# Patient Record
Sex: FEMALE | Race: WHITE
[De-identification: names, ages, dates, MRNs, and addresses within clinical notes are randomized per-mention and may not be internally consistent; named-entity substitution may affect disease eponyms.]

---

## 2017-05-04 ENCOUNTER — HOSPITAL ENCOUNTER (OUTPATIENT)
Dept: HOSPITAL 8 - LAB | Age: 22
Discharge: HOME | End: 2017-05-04
Payer: SELF-PAY

## 2017-05-04 DIAGNOSIS — K58.9: Primary | ICD-10-CM

## 2017-05-04 PROCEDURE — 36415 COLL VENOUS BLD VENIPUNCTURE: CPT

## 2018-06-05 ENCOUNTER — OFFICE VISIT (OUTPATIENT)
Dept: RHEUMATOLOGY | Facility: PHYSICIAN GROUP | Age: 23
End: 2018-06-05

## 2018-06-05 VITALS
WEIGHT: 123.2 LBS | BODY MASS INDEX: 18.19 KG/M2 | OXYGEN SATURATION: 97 % | TEMPERATURE: 98.5 F | HEART RATE: 70 BPM | RESPIRATION RATE: 16 BRPM | DIASTOLIC BLOOD PRESSURE: 80 MMHG | SYSTOLIC BLOOD PRESSURE: 112 MMHG

## 2018-06-05 DIAGNOSIS — R77.8 LOW SERUM COMPLEMENT C4: ICD-10-CM

## 2018-06-05 DIAGNOSIS — R76.8 POSITIVE ANA (ANTINUCLEAR ANTIBODY): ICD-10-CM

## 2018-06-05 DIAGNOSIS — M25.50 POLYARTHRALGIA: ICD-10-CM

## 2018-06-05 PROCEDURE — 99244 OFF/OP CNSLTJ NEW/EST MOD 40: CPT | Performed by: INTERNAL MEDICINE

## 2018-06-05 RX ORDER — PREDNISONE 5 MG/1
5 TABLET ORAL DAILY
COMMUNITY
End: 2019-10-10

## 2018-06-05 RX ORDER — LEFLUNOMIDE 10 MG/1
10 TABLET ORAL DAILY
COMMUNITY
End: 2018-07-31

## 2018-06-05 RX ORDER — CHLORAL HYDRATE 500 MG
1000 CAPSULE ORAL
COMMUNITY
End: 2020-12-19

## 2018-06-05 ASSESSMENT — PAIN SCALES - GENERAL: PAINLEVEL: NO PAIN

## 2018-07-14 ENCOUNTER — HOSPITAL ENCOUNTER (OUTPATIENT)
Dept: LAB | Facility: MEDICAL CENTER | Age: 23
End: 2018-07-14
Attending: INTERNAL MEDICINE
Payer: COMMERCIAL

## 2018-07-14 DIAGNOSIS — R77.8 LOW SERUM COMPLEMENT C4: ICD-10-CM

## 2018-07-14 DIAGNOSIS — M25.50 POLYARTHRALGIA: ICD-10-CM

## 2018-07-14 DIAGNOSIS — R76.8 POSITIVE ANA (ANTINUCLEAR ANTIBODY): ICD-10-CM

## 2018-07-14 PROCEDURE — 36415 COLL VENOUS BLD VENIPUNCTURE: CPT

## 2018-07-14 PROCEDURE — 86480 TB TEST CELL IMMUN MEASURE: CPT

## 2018-07-16 LAB
M TB TUBERC IFN-G BLD QL: NEGATIVE
M TB TUBERC IFN-G/MITOGEN IGNF BLD: 0
M TB TUBERC IGNF/MITOGEN IGNF CONTROL: 41.8 [IU]/ML
MITOGEN IGNF BCKGRD COR BLD-ACNC: 0.03 [IU]/ML

## 2018-07-24 ENCOUNTER — APPOINTMENT (OUTPATIENT)
Dept: RHEUMATOLOGY | Facility: PHYSICIAN GROUP | Age: 23
End: 2018-07-24
Payer: COMMERCIAL

## 2018-07-31 ENCOUNTER — OFFICE VISIT (OUTPATIENT)
Dept: RHEUMATOLOGY | Facility: PHYSICIAN GROUP | Age: 23
End: 2018-07-31
Payer: COMMERCIAL

## 2018-07-31 VITALS
OXYGEN SATURATION: 98 % | TEMPERATURE: 97.8 F | HEART RATE: 58 BPM | BODY MASS INDEX: 19.02 KG/M2 | RESPIRATION RATE: 16 BRPM | WEIGHT: 128.8 LBS | DIASTOLIC BLOOD PRESSURE: 80 MMHG | SYSTOLIC BLOOD PRESSURE: 110 MMHG

## 2018-07-31 DIAGNOSIS — R53.83 OTHER FATIGUE: ICD-10-CM

## 2018-07-31 DIAGNOSIS — Z79.899 LONG-TERM USE OF HYDROXYCHLOROQUINE: ICD-10-CM

## 2018-07-31 DIAGNOSIS — R76.8 POSITIVE ANA (ANTINUCLEAR ANTIBODY): ICD-10-CM

## 2018-07-31 DIAGNOSIS — M06.00 SERONEGATIVE RHEUMATOID ARTHRITIS (HCC): ICD-10-CM

## 2018-07-31 PROCEDURE — 99214 OFFICE O/P EST MOD 30 MIN: CPT | Performed by: INTERNAL MEDICINE

## 2018-07-31 RX ORDER — HYDROXYCHLOROQUINE SULFATE 200 MG/1
200 TABLET, FILM COATED ORAL DAILY
Qty: 30 TAB | Refills: 3 | Status: SHIPPED | OUTPATIENT
Start: 2018-07-31 | End: 2019-06-06

## 2018-07-31 ASSESSMENT — ENCOUNTER SYMPTOMS
FEVER: 0
CHILLS: 0
MYALGIAS: 0
COUGH: 0

## 2018-08-01 NOTE — PROGRESS NOTES
Chief Complaint- positive FANY    Chief Complaint   Patient presents with   • New Patient     Amalia Villalba is a 22 y.o. female here as a new Rheumatology Consult referred by Pcp Pt States None for consultation regarding positive FANY          HPI:   22 y.o. female presenting with a diagnosis of seronegative rheumatoid arthritis diagnosed in [place].  She was started on leflunomide and reports being on this medication for about 3 months.  She notes improvement with decrease swelling and decrease episodes of flares..  SHe reports sicca symptoms, polyarthralgia and oral ulcers.    Ms. Villalba states she had noticed b/l hand, wrist and knee pain since when she was in high school.   Her symptoms have worsened. Thus, she was evaluated by rheumatologist in [place] in 1/2018 and was diagnosed with seronegative RA.  She tried Methotrexate and didn't tolerate MTX.  She didn't tolerate Sulfasalazine due to GI adverse effect.  She didn't want to try Plaquenil because of adverse effect of vision changes.   She was started on Leflunomide around 3/2018.  She feels that leflunamide is helping with her symptoms.  Though, she still has significant joint pain.  She states that she gets swelling and pain of b/l MCP and PIP joints and toes symmetrically.  She also c/o b/l knee pain.  She also reports mid thoracic back and neck pain which is worse at night when she is not moving.  She reports morning stiffness lasting about 30-60 min.  She reports 4 flare ups between March and May with her last flare up being the most severe where she had diffuse pain, stiffness and difficulty with mobility.   She was treated with Prednisone and last dose of Prednisone was week of 5/11.  She reports intermittent oral sore every few months and petechia like rash on her arms b/l.   She has sicca symptoms.    She has a h/o IBS.  She states that she sometimes gets bloody diarrhea.  She was evaluated by GI with EGD and Colonoscopy in the past.  Patient  states work up was negative except for GERD which she attributes it to drinking etoh and consuming spicy food.   She stopped drinking and cut down on spicy food.   She is currently on whole 30 diet that has low antiinflammatory diet.      On ROS, she reported chills and nightsweats.  She had multiple negative TB tests with last TB was 1 year ago.   No history of TB in the past.   SHe does report fingers turning purple and white suggestive of Raynauds however on further questioning it seems she is describing diffuse rather than a well demarcated region.      Pertinent Labs:  Lyme antibodies were negative 8/2/2017  TSH 1.68  8/2/2017  CCP negative  8/2/2017  EBV IgM was negative  8/2/2017  EBV IgG elevated at 361 (>21.99 is positive)  Ferritin = 24 8/2/2017  AMSA = negative  stritated muscle antibody screening negative  Anti myocardial antibody is negative  dsDNA by crithidia method is negative 8/2/2017  C3C = 109   8/2/2017  C4c = 13 (16-47) 8/2/2017  Ribosomal antibody is negative  8/2/2017  scl 70 antibody is negative 8/2/2017  Sm/RNP antibody is negative  8/2/2017  SSB antibody is negative  8/2/2017  SSA antibody is negative 8/2/2017  Rheumatoid factor is negative 8/2/2017  FANY 1:80 titer speckled  8/2/2017  Sm antibody is negative 8/2/2017  H pylori breath test was negative 8/2/2017      Past Medical HIstory: GERD, IBS with diarrhea, bilateral wrist pain, diagnosed with seronegative rheumatoid arthritis, no miscarriages and no blood clot history    Family History: autn has rheumatoid arthirtis, father has thyroid disease    Social History: rarely drinks alcohol, does not smoke cigarrettes uses CBD Liquid, smokes marijuana, on whole 30 diet      Previous Medication  leflunomide  Methotrexate - did not make her feel well  Sulfasalazine  - cause GI problem    Has not tried  plaquenil    Current medicines (including changes today)  Current Outpatient Prescriptions   Medication Sig Dispense Refill   • leflunomide  (ARAVA) 10 MG Tab Take 10 mg by mouth every day.     • predniSONE (DELTASONE) 5 MG Tab Take 5 mg by mouth every day.     • vitamin D (CHOLECALCIFEROL) 1000 UNIT Tab Take 1,000 Units by mouth every day.     • VITAMIN K PO Take  by mouth.     • Omega-3 Fish Oil (OMEGA 3 FATTY ACIDS) 1000 MG Cap Take 1,000 mg by mouth 3 times a day, with meals.     • Borage, Borago officinalis, (BORAGE OIL PO) Take  by mouth.     • Multiple Vitamins-Minerals (MULTIVITAMIN ADULT PO) Take  by mouth.       No current facility-administered medications for this visit.      She  has a past medical history of Asthma.  She  has no past surgical history on file.  No family history on file.  No family status information on file.     Social History   Substance Use Topics   • Smoking status: Never Smoker   • Smokeless tobacco: Not on file   • Alcohol use No     Social History     Social History Narrative   • No narrative on file         ROS  Review of Systems   Constitutional: Positive for chills and weight loss (Intentional). Negative for fever.        Night sweats   HENT: Negative for congestion and nosebleeds.    Eyes: Positive for redness. Negative for pain.        Dry eyes   Respiratory: Positive for shortness of breath. Negative for cough and hemoptysis.    Cardiovascular: Negative for chest pain and palpitations.   Gastrointestinal: Positive for diarrhea. Negative for abdominal pain.        Heartburn controlled with diet modification   Genitourinary: Negative for dysuria and hematuria.        Polyuria   Musculoskeletal:        Per HPI   Skin: Negative for itching.        Intermittent petechia like rash on b/l UE   Neurological: Negative.  Negative for headaches.   Endo/Heme/Allergies: Bruises/bleeds easily.          Objective:     Blood pressure 112/80, pulse 70, temperature 36.9 °C (98.5 °F), resp. rate 16, weight 55.9 kg (123 lb 3.2 oz), SpO2 97 %. Body mass index is 18.19 kg/m².  Physical Exam:    Vitals:    06/05/18 1607   BP: 112/80    Pulse: 70   Resp: 16   Temp: 36.9 °C (98.5 °F)   SpO2: 97%   Weight: 55.9 kg (123 lb 3.2 oz)    Body mass index is 18.19 kg/m².    General/Constitutional: NAD not diaphoretic, comfortable  Eyes: clear conjunctiva, no scleral icterus, EOMI  Ears, Nose, Mouth,Throat: no oral ulcers, good dentition, torrus on hard palate, moist mucous membranes, no discharge from ears bilaterally  Cardiovascular: regular rate and rhythm.  No murmurs, gallops, rubs  Respiratory: normal effort, unlabored respiration.  On auscultation no wheezes, rales, rhonchi.  Clear to auscultation.  GI: soft, NTTP no hepatosplenomegaly, nondistended  Musculoskeletal  Axial:  Cervical: negative occiput to wall testing  Thoracic: Non tender  Lumbar: Non tender  Schoeber test: Positive.  3.5 cm change on forward flexion.      Upper Extremities:  No synovitis of the PIP, DIP, MCP.   Bilateral MCP and PIP : Boggy and tender to palpation  No Heberbon nodes appreciated at the DIP  Wrists and Elbows have good ROM without any rheumatoid nodules  Muscle Strength: 5/5 in deltoids, biceps, triceps, finger , wrists extension bilateral  Lower Extremities:  No knee effusion bilateral, No crepitus bilateral  MTP tender to palpation bilaterally  Muscle Strength: 5/5 in dorsiflexion, plantarflexion, knee extension, knee flexion, and hip flexion bilateral  Gait is normal  Skin: Limited skin exam.  no rashes, no digital ulcerations, no alopecia, no tophi, no skin thickening, no nodules  Neuro: CN II-XII grossly intact, Alert, Oriented x 3, moves all four extremities  Psych: normal affect, normal mood, judgement appropriate, follows commands, responses are appropritae  Heme/Lymph: no cervical adenopathy      No results found for: QNTTBGOLD  No results found for: HEPBCORTOT, HEPBCORIGM, HEPBSAG  No results found for: HEPCAB  No results found for: SODIUM, POTASSIUM, CHLORIDE, CO2, GLUCOSE, BUN, CREATININE, BUNCREATRAT, GLOMRATE   No results found for: WBC, RBC,  HEMOGLOBIN, HEMATOCRIT, MCV, MCH, MCHC, MPV, NEUTSPOLYS, LYMPHOCYTES, MONOCYTES, EOSINOPHILS, BASOPHILS, HYPOCHROMIA, ANISOCYTOSIS   No results found for: CALCIUM, ASTSGOT, ALTSGPT, ALKPHOSPHAT, TBILIRUBIN, ALBUMIN, TOTPROTEIN  No results found for: URICACID, RHEUMFACTN, CCPANTIBODY, ANTINUCAB  No results found for: SEDRATEWES, CREACTPROT  No results found for: RUSSELVIPER, DRVVTINTERP  No results found for: Z7GLFPLXHYQ, H7UMMGTXFKB, IGGCARDIOLI, IGMCARDIOLI, IGACARDIOLI, CRYOGLOBULIN  No results found for: ANADIRECT, ANTIDNADS, RNPAB, SMITHAB, MBELLPE97, SSAROAB, SSBLAAB, ZOVIGI3WH, CENTROMBAB  No results found for: ANTIDNADS, DSDNA, AGBMAB, GBMABA, ANCAIGG, Q4FRVBECBTV, JO1AB, RNPAB, ANTISSASJ, ANTISSBSJ  No results found for: COLORURINE, SPECGRAVITY, PHURINE, GLUCOSEUR, KETONES, PROTEINURIN  No results found for: TOTPROTUR, TOTALVOLUME, [id number]  No results found for: SSA60, SSA52  No results found for: HBA1C  No results found for: CPKTOTAL  No results found for: G6PD  No results found for: YPFL09BMIF  No results found for: ACESERUM  No results found for: 25HYDROXY  No results found for: TSH, FREEDIR  No results found for: TSHULTRASEN, FREET4  No results found for: MICROSOMALA, ANTITHYROGL  No results found for: IGGLYMEABS  No results found for: ANTIMITOCHO, FACTIN  No results found for: IGA, TTRANSIGA, ENDOIGA  No results found for: FLTYPE, CRYSTALSBDF  No results found for: ISTATICAL  No results found for: ISTATCREAT  No results found for: CTELOPEP  No results found for: GBMABG  No results found for: PTHINTACT        Assessment and Plan:    This is a 23 y/o F who was recently diagnosed with seronegative RA.  She reports symmetrical polyarthritis involving wrists, MCP and PIP and MTP joints.   Her prior lab revealed weakly positive FANY of 1:80 in speckled pattern and mildly decreased C4.   In the setting of symmetrical polyarthritis and positive FANY, her oral ulcer is concerning for SLE.   Her midthoracic back  pain as well as positive Schober test and chronic diarrhea are concerning for Seronegative spondyloarthropathy, specifically Ankylosing Spondylosis.  Will check for Hep B, C and further autoimmune work up.  Meantime, patient to continue Leflunomide.  Discussed with patient extensively about teratogenicity of Leflunomide.   She should be on birth control method while on Leflunomide and wait about 1 year to conceive after discontinuing leflunamide.   If leflunamide is not effective, next step would be biologics since she didn't tolerate Sulfasalazine and MTX and doesn't want to use Plaquenil due to its adverse effect.       Plan:   Continue Leflunomide after completing medication toxicity labs and screening labs.  Will get following labs for further work-up of her FANY and concern for inflammatory back pain symptoms.  1. Positive FANY (antinuclear antibody)  ANTITHYROGLOBULIN AB    THYROID PEROXIDASE  (TPO) AB    ANTI SCL-70 ABS    CENTROMERE AB, IGG    COMPLEMENT TOTAL (CH50)    ANTI-DNA (DS)    COMPLEMENT C3    COMPLEMENT C4    WESTERGREN SED RATE    CRP QUANTITIVE (NON-CARDIAC)    CBC WITH DIFFERENTIAL    COMP METABOLIC PANEL    G6PD QUANT + RBC    HEP B CORE AB TOTAL    HEP B SURFACE ANTIGEN    HEP C VIRUS ANTIBODY    TB TEST CELL IMM MEASURE AG (QUANTIFERON)    HLA-B27    DX-SACROILIAC JOINTS 3+    ANTI-HISTONE ABS   2. Low serum complement C4  ANTITHYROGLOBULIN AB    THYROID PEROXIDASE  (TPO) AB    ANTI SCL-70 ABS    CENTROMERE AB, IGG    COMPLEMENT TOTAL (CH50)    ANTI-DNA (DS)    COMPLEMENT C3    COMPLEMENT C4    WESTERGREN SED RATE    CRP QUANTITIVE (NON-CARDIAC)    CBC WITH DIFFERENTIAL    COMP METABOLIC PANEL    G6PD QUANT + RBC    HEP B CORE AB TOTAL    HEP B SURFACE ANTIGEN    HEP C VIRUS ANTIBODY    TB TEST CELL IMM MEASURE AG (QUANTIFERON)    HLA-B27    DX-SACROILIAC JOINTS 3+    ANTI-HISTONE ABS   3. Polyarthralgia  ANTITHYROGLOBULIN AB    THYROID PEROXIDASE  (TPO) AB    ANTI SCL-70 ABS    CENTROMERE AB,  IGG    COMPLEMENT TOTAL (CH50)    ANTI-DNA (DS)    COMPLEMENT C3    COMPLEMENT C4    WESTERGREN SED RATE    CRP QUANTITIVE (NON-CARDIAC)    CBC WITH DIFFERENTIAL    COMP METABOLIC PANEL    G6PD QUANT + RBC    HEP B CORE AB TOTAL    HEP B SURFACE ANTIGEN    HEP C VIRUS ANTIBODY    TB TEST CELL IMM MEASURE AG (QUANTIFERON)    HLA-B27    DX-SACROILIAC JOINTS 3+    ANTI-HISTONE ABS     Return in about 5 weeks (around 7/10/2018).      Records requested from Rheumatology in [place]    Followup: Return in about 5 weeks (around 7/10/2018). or sooner prn  Patient was seen 60 minutes face-to-face (excluding time for procedures)  of which more than 50% the time was spent counseling the patient regarding  rheumatological conditions and care. Therapy was discussed in detail.  Thank you for this referral.      At the time of the visit, I personally examined the patient and evaluated the patient's medical history, physical examination, laboratory results/studies, and assessment and I discussed the findings and formulated the care plan documented with the resident physician, Dr. Dangelo

## 2018-08-01 NOTE — PROGRESS NOTES
ESTABLISHED PATIENT    Ms. Amalia Villalba is a 22 y.o. female here for follow-up for seronegative rheumatoid arthritis    Seronegative rheumatoid arthritis  She was on leflunomide for 3 months and didn't notice an improvement.  She reports morning stiffness in her hands and wrists that lasts 30 minutes and she notes mild swelling in her fingers such that she is unable to wear ring.   She is also reporting back pain.  SHe also reports pain is worse at night      Current Medications:  Not on any medication      RHEUM HISTORY  22 y.o. female presenting with a diagnosis of seronegative rheumatoid arthritis diagnosed in [place].  She was started on leflunomide and reports being on this medication for about 3 months.  She notes improvement with decrease swelling and decrease episodes of flares..  SHe reports sicca symptoms, polyarthralgia and oral ulcers.     Ms. Villalba states she had noticed b/l hand, wrist and knee pain since when she was in high school.   Her symptoms have worsened. Thus, she was evaluated by rheumatologist in [place] in 1/2018 and was diagnosed with seronegative RA.  She tried Methotrexate and didn't tolerate MTX.  She didn't tolerate Sulfasalazine due to GI adverse effect.  She didn't want to try Plaquenil because of adverse effect of vision changes.   She was started on Leflunomide around 3/2018.  She feels that leflunamide is helping with her symptoms.  Though, she still has significant joint pain.  She states that she gets swelling and pain of b/l MCP and PIP joints and toes symmetrically.  She also c/o b/l knee pain.  She also reports mid thoracic back and neck pain which is worse at night when she is not moving.  She reports morning stiffness lasting about 30-60 min.  She reports 4 flare ups between March and May with her last flare up being the most severe where she had diffuse pain, stiffness and difficulty with mobility.   She was treated with Prednisone and last dose of Prednisone was  week of 5/11.  She reports intermittent oral sore every few months and petechia like rash on her arms b/l.   She has sicca symptoms.    She has a h/o IBS.  She states that she sometimes gets bloody diarrhea.  She was evaluated by GI with EGD and Colonoscopy in the past.  Patient states work up was negative except for GERD which she attributes it to drinking etoh and consuming spicy food.   She stopped drinking and cut down on spicy food.   She is currently on whole 30 diet that has low antiinflammatory diet.       In review of records from [facility] she first presented to rheumatology to see Maite Quiles MD on February 16, 2018 and at that time had complained of pain in her fingers and knees.  She had reported that she had shooting pains in her knees even death as a child.  She also reports dry eyes and dry throat and dry skin.  And also increased fatigue.  Patient also notes canker sores since childhood as well.  At the initial exam there is discomfort of the MCPs and PIPs but no acute swelling.  PIPs also has a joint appearance of mild arthritis.  Otherwise no swelling of the wrists or DIPs or elbows.  There is also noted upper back discomfort at the level of the scapula on the right and right mid back pain.  Labs at that time did demonstrate that she had a positive FANY speckled pattern and C3 was slightly low at 13.    In follow-up she was noted to not respond to ibuprofen and she had morning symptoms of pain and tightness.  She was given a trial of methotrexate this was her last visit note that we have.     Pertinent Labs:  Lyme antibodies were negative 8/2/2017  TSH 1.68  8/2/2017  CCP negative  8/2/2017  EBV IgM was negative  8/2/2017  EBV IgG elevated at 361 (>21.99 is positive)  Ferritin = 24 8/2/2017  AMSA = negative  stritated muscle antibody screening negative  Anti myocardial antibody is negative  dsDNA by crithidia method is negative 8/2/2017  C3C = 109   8/2/2017  C4c = 13 (16-47)  8/2/2017  Ribosomal antibody is negative  8/2/2017  scl 70 antibody is negative 8/2/2017  Sm/RNP antibody is negative  8/2/2017  SSB antibody is negative  8/2/2017  SSA antibody is negative 8/2/2017  Rheumatoid factor is negative 8/2/2017  FANY 1:80 titer speckled  8/2/2017  Sm antibody is negative 8/2/2017  H pylori breath test was negative 8/2/2017   IgA serum 153 ()  4/30/2016  TRansglulaminase IgA was negative 4/30/2016  G6PD HLA-B27 antigen was negative on 6/28/2018  Antihistone antibody was negative on 6/28/2018  Thyroid peroxidase and thyroglobulin antibodies were negative on 6/28/2018  G6PD Enzyme was 15.9 (7.0-20.5)  Anti-double-stranded DNA was negative  SCL 70 antibody was negative  Centromere antibody was negative on 6/28/2018  Hepatitis B surface antigen was nonreactive on 6/28/2018  Hepatitis C antibody was negative on 6/28/2018  Hepatitis B core antibody was nonreactive         Past Medical HIstory: GERD, IBS with diarrhea, bilateral wrist pain, diagnosed with seronegative rheumatoid arthritis, no miscarriages and no blood clot history     Family History: autn has rheumatoid arthirtis, father has thyroid disease, maternal history of breast cancer family history of hypertension and paternal side had thyroid disease.  Older sister committed suicide in 2016 jumped off a bridge in [place].     Social History: rarely drinks alcohol, does not smoke cigarrettes uses CBD Liquid, smokes marijuana, on whole 30 diet        Previous Medication  leflunomide  Methotrexate - did not make her feel well  Sulfasalazine  - cause GI problem     Has not tried  plaquenil              Current Outpatient Prescriptions on File Prior to Visit   Medication Sig Dispense Refill   • predniSONE (DELTASONE) 5 MG Tab Take 5 mg by mouth every day.     • vitamin D (CHOLECALCIFEROL) 1000 UNIT Tab Take 1,000 Units by mouth every day.     • VITAMIN K PO Take  by mouth.     • Omega-3 Fish Oil (OMEGA 3 FATTY ACIDS) 1000 MG Cap Take 1,000  mg by mouth 3 times a day, with meals.     • Borage, Borago officinalis, (BORAGE OIL PO) Take  by mouth.     • Multiple Vitamins-Minerals (MULTIVITAMIN ADULT PO) Take  by mouth.     • leflunomide (ARAVA) 10 MG Tab Take 10 mg by mouth every day.       No current facility-administered medications on file prior to visit.        REVIEW OF SYSTEMS  Review of Systems   Constitutional: Negative for chills and fever.   Respiratory: Negative for cough.    Musculoskeletal: Negative for myalgias.       PHYSICAL EXAM  Ambulatory Vitals  Encounter Vitals  Temperature: 36.6 °C (97.8 °F)  Temp Source: Temporal  Blood Pressure: 110/80  BP Location: Right, Upper Arm  Patient BP Position: Sitting  Pulse: (!) 58  Respiration: 16  Pulse Oximetry: 98 %  Weigh58.4t:  kg (128 lb 12.8 oz)  Weight Source: Stand Up Scale    Physical Exam   Constitutional: She is oriented to person, place, and time and well-developed, well-nourished, and in no distress. No distress.   HENT:   Head: Normocephalic and atraumatic.   Right Ear: External ear normal.   Left Ear: External ear normal.   Eyes: Conjunctivae are normal. Right eye exhibits no discharge. Left eye exhibits no discharge. No scleral icterus.   Cardiovascular: Normal rate.    Pulmonary/Chest: Effort normal. No stridor. No respiratory distress.   Musculoskeletal: She exhibits no edema.   No active synovitis appreciated on palpation but there is tenderness of the MCPs and PIPs in the wrists.  There is mild bogginess in the left wrist on palpation.   Neurological: She is alert and oriented to person, place, and time. Gait normal.   Skin: Skin is warm and dry. No rash noted. She is not diaphoretic. No erythema.   Psychiatric: Mood, memory, affect and judgment normal.   Vitals reviewed.          DIAGNOSTICS:    Labs      Lab Results   Component Value Date/Time    QNTTBGOLD Negative 07/14/2018 01:42 PM     Labs dated June 28, 2018 showed white blood cell count of 4.6 with a hemoglobin of 12.7 and a  platelet count of 239.  AST was 15 ALT is 12 creatinine was 0.91.  ESR was 9  Imaging          ASSESSMENT AND PLAN:  Ms. Amalia Villalba presents for follow-up of seronegative rheumatoid arthritis.  The patient reports that she has been nonresponsive to leflunomide, intolerant of methotrexate and sulfasalazine.  I suggested today that we try Plaquenil.  We will give this a trial of 9 months.  I did discuss with her the importance of getting a baseline eye exam and follow with eye exams every 9 months I also discussed with her the risks and benefits of Plaquenil including but not limited to cytopenias as well as rash/photosensitivity, as well as retinal toxicity.    We did discuss the possibility of anti-TNF therapy however at this time think we should retry Plaquenil first.    I did discuss with the patient that there is a possibility there is a component of her pain that could be neuropathic and proposed gabapentin.  Patient is reluctant to try given that there is some degree of cross-reactivity with a birth control that had caused a rash.  We will revisit in the future.  In review of her records she does have a low complement C4 level that was noted on her records from [place].    I suggest that we will recheck that in the future.    Currently the patient is reporting fatigue which could be due to her systemic disease.  I will also check a TSH.          1. Other fatigue  TSH+FREE T4   2. Long-term use of hydroxychloroquine  CBC WITH DIFFERENTIAL    COMP METABOLIC PANEL    REFERRAL TO OPHTHALMOLOGY   3. Seronegative rheumatoid arthritis (HCC)     4. Positive FANY (antinuclear antibody)       Return in about 8 weeks (around 9/25/2018).        she agreed and verbalized she agreement and understanding with the current plan. I answered all questions and concerns she has at this time and advised our patient to call at any time in there interim with questions or concerns in regards she care.     Thank you for  allowing me to participate in the care of this patient, I will continue to follow closely.      Please note that this dictation was created using voice recognition software. I have made every reasonable attempt to correct obvious errors, but I expect that there are errors of grammar and possibly content that I did not discover before finalizing the note.

## 2019-06-06 ENCOUNTER — OFFICE VISIT (OUTPATIENT)
Dept: RHEUMATOLOGY | Facility: MEDICAL CENTER | Age: 24
End: 2019-06-06
Payer: COMMERCIAL

## 2019-06-06 VITALS
HEART RATE: 104 BPM | SYSTOLIC BLOOD PRESSURE: 85 MMHG | HEIGHT: 69 IN | BODY MASS INDEX: 20.7 KG/M2 | DIASTOLIC BLOOD PRESSURE: 60 MMHG | TEMPERATURE: 98.7 F | OXYGEN SATURATION: 98 % | WEIGHT: 139.77 LBS

## 2019-06-06 DIAGNOSIS — Z79.899 LONG-TERM USE OF PLAQUENIL: ICD-10-CM

## 2019-06-06 DIAGNOSIS — M06.00 SERONEGATIVE RHEUMATOID ARTHRITIS (HCC): Primary | ICD-10-CM

## 2019-06-06 DIAGNOSIS — R77.8 LOW SERUM COMPLEMENT C4: ICD-10-CM

## 2019-06-06 PROCEDURE — 99214 OFFICE O/P EST MOD 30 MIN: CPT | Performed by: INTERNAL MEDICINE

## 2019-06-06 RX ORDER — HYDROXYCHLOROQUINE SULFATE 200 MG/1
300 TABLET, FILM COATED ORAL DAILY
Qty: 135 TAB | Refills: 1 | Status: SHIPPED | OUTPATIENT
Start: 2019-06-06 | End: 2019-07-23 | Stop reason: SDUPTHER

## 2019-06-06 RX ORDER — MELOXICAM 15 MG/1
15 TABLET ORAL DAILY
Qty: 90 TAB | Refills: 1 | Status: SHIPPED | OUTPATIENT
Start: 2019-06-06 | End: 2019-07-23 | Stop reason: SDUPTHER

## 2019-06-06 NOTE — PROGRESS NOTES
"RHEUMATOLOGY CLINIC FOLLOW UP NOTE        Chief complaint:  Follow up seronegative RA        HPI:  22 y/o F with seronegative RA diagnosed in [place] presents today for follow up,  she is a new patient to me, previously followed by Dr. Andre, last seen 7/2018.    At the last visit she was ordered to start plaquenil however never did so due to fear of side effects.  She reports that she actually went flare free until starting in February of this year.    Patient reports that that her hands, wrists, knees and toes are the most affected.  She will only notice swelling of her fingers and toes.  She will get \"flares\" that last about 1 week, has had 3 flares over the last few months.  She takes naproxen which varma snot help.  She reports that alcohol and CBD do help. She has been given prednisone in the past, she develops significant anger and does not like how it makes her feels, maybe helps her joints.  Does get a rash on her inner forerarms that can last 1-2 days, this is to always associated with flares. She has tried SSZ, MTX and leflunomide and reports the side effects were too bad to continue.  She also reports that naproxen does not help her much.    Maternal aunt with JRA.  No personal or family hx of psoriasis.    She has issues with diarrhea, there is sometimes blood.  She has had a colonoscopy in 2017 with no IBD found.    She is sexually active with men, has an appt for IUD placement June 27th.        Rheum Background:  Per Dr. Andre's notes:  22 y.o. female presenting with a diagnosis of seronegative rheumatoid arthritis diagnosed in [place].  She was started on leflunomide and reports being on this medication for about 3 months.  She notes improvement with decrease swelling and decrease episodes of flares..  SHe reports sicca symptoms, polyarthralgia and oral ulcers.     Ms. Villalba states she had noticed b/l hand, wrist and knee pain since when she was in high school.   Her symptoms have worsened. Thus, she was " evaluated by rheumatologist in [place] in 1/2018 and was diagnosed with seronegative RA.  She tried Methotrexate and didn't tolerate MTX.  She didn't tolerate Sulfasalazine due to GI adverse effect.  She didn't want to try Plaquenil because of adverse effect of vision changes.   She was started on Leflunomide around 3/2018.  She feels that leflunamide is helping with her symptoms.  Though, she still has significant joint pain.  She states that she gets swelling and pain of b/l MCP and PIP joints and toes symmetrically.  She also c/o b/l knee pain.  She also reports mid thoracic back and neck pain which is worse at night when she is not moving.  She reports morning stiffness lasting about 30-60 min.  She reports 4 flare ups between March and May with her last flare up being the most severe where she had diffuse pain, stiffness and difficulty with mobility.   She was treated with Prednisone and last dose of Prednisone was week of 5/11.  She reports intermittent oral sore every few months and petechia like rash on her arms b/l.   She has sicca symptoms.    She has a h/o IBS.  She states that she sometimes gets bloody diarrhea.  She was evaluated by GI with EGD and Colonoscopy in the past.  Patient states work up was negative except for GERD which she attributes it to drinking etoh and consuming spicy food.   She stopped drinking and cut down on spicy food.   She is currently on whole 30 diet that has low antiinflammatory diet.       In review of records from [facility] she first presented to rheumatology to see Maite Quiles MD on February 16, 2018 and at that time had complained of pain in her fingers and knees.  She had reported that she had shooting pains in her knees even death as a child.  She also reports dry eyes and dry throat and dry skin.  And also increased fatigue.  Patient also notes canker sores since childhood as well.  At the initial exam there is discomfort of the MCPs and PIPs but no  acute swelling.  PIPs also has a joint appearance of mild arthritis.  Otherwise no swelling of the wrists or DIPs or elbows.  There is also noted upper back discomfort at the level of the scapula on the right and right mid back pain.  Labs at that time did demonstrate that she had a positive FANY speckled pattern and C4 was slightly low at 13.     In follow-up she was noted to not respond to ibuprofen and she had morning symptoms of pain and tightness.  She was given a trial of methotrexate this was her last visit note that we have.     Pertinent Labs:  Lyme antibodies were negative 8/2/2017  TSH 1.68  8/2/2017  CCP negative  8/2/2017  EBV IgM was negative  8/2/2017  EBV IgG elevated at 361 (>21.99 is positive)  Ferritin = 24 8/2/2017  AMSA = negative  stritated muscle antibody screening negative  Anti myocardial antibody is negative  dsDNA by crithidia method is negative 8/2/2017  C3C = 109   8/2/2017  C4c = 13 (16-47) 8/2/2017  Ribosomal antibody is negative  8/2/2017  scl 70 antibody is negative 8/2/2017  Sm/RNP antibody is negative  8/2/2017  SSB antibody is negative  8/2/2017  SSA antibody is negative 8/2/2017  Rheumatoid factor is negative 8/2/2017  FANY 1:80 titer speckled  8/2/2017  Sm antibody is negative 8/2/2017  H pylori breath test was negative 8/2/2017   IgA serum 153 ()  4/30/2016  TRansglulaminase IgA was negative 4/30/2016  HLA-B27 antigen was negative on 6/28/2018  Antihistone antibody was negative on 6/28/2018  Thyroid peroxidase and thyroglobulin antibodies were negative on 6/28/2018  G6PD Enzyme was 15.9 (7.0-20.5)  Anti-double-stranded DNA was negative  SCL 70 antibody was negative  Centromere antibody was negative on 6/28/2018  Hepatitis B surface antigen was nonreactive on 6/28/2018  Hepatitis C antibody was negative on 6/28/2018  Hepatitis B core antibody was nonreactive   Quantiferon negative 7/2018        Past Medical HIstory: GERD, IBS with diarrhea, bilateral wrist pain, diagnosed  with seronegative rheumatoid arthritis, no miscarriages and no blood clot history     Family History: autn has rheumatoid arthirtis, father has thyroid disease, maternal history of breast cancer family history of hypertension and paternal side had thyroid disease.  Older sister committed suicide in 2016 jumped off a bridge in [place].     Social History: rarely drinks alcohol, does not smoke cigarrettes uses CBD Liquid, smokes marijuana, on whole 30 diet        Previous Medication  Leflunomide  Methotrexate - did not make her feel well  Sulfasalazine  - cause GI problem      ROS:    GEN: denies fevers  GI: Positive for intermittent diarrhea  Heme/Onc: no history of cancers or hematologic abnormalities  Skin: no rashes currently  MS: Per HPI            OUTPATIENT MEDS:    Prior to Admission medications    Medication Sig Start Date End Date Taking? Authorizing Provider   Multiple Vitamins-Minerals (MULTIVITAMIN ADULT PO) Take  by mouth.   Yes Physician Outpatient   hydroxychloroquine (PLAQUENIL) 200 MG Tab Take 1 Tab by mouth every day.  Patient not taking: Reported on 6/6/2019 7/31/18   Rachele Andre   predniSONE (DELTASONE) 5 MG Tab Take 5 mg by mouth every day.    Physician Outpatient   vitamin D (CHOLECALCIFEROL) 1000 UNIT Tab Take 1,000 Units by mouth every day.    Physician Outpatient   VITAMIN K PO Take  by mouth.    Physician Outpatient   [place]-3 Fish Oil (OMEGA 3 FATTY ACIDS) 1000 MG Cap Take 1,000 mg by mouth 3 times a day, with meals.    Physician Outpatient   Borage, Borago officinalis, (BORAGE OIL PO) Take  by mouth.    Physician Outpatient           Past Medical History:   Diagnosis Date   • Asthma             reports that she has never smoked. She does not have any smokeless tobacco history on file. She reports that she does not drink alcohol or use drugs.             Physical Exam:     BP (!) 85/60 (BP Location: Right arm, Patient Position: Sitting, BP Cuff Size: Adult)   Pulse (!) 104   Temp 37.1 °C (98.7  "°F) (Temporal)   Ht 1.753 m (5' 9\")   Wt 63.4 kg (139 lb 12.4 oz)   SpO2 98%   BMI 20.64 kg/m²         GEN: well-appearing, NAD  Head: no focal alopecia, no hair thinning  ENT: no conjunctival icterus or injection, no LAD, no oral ulcers  CV: nml S1, S2, no murmurs, RRR  Pulm: normal chest expansion, speaking in full sentences, CTAB, no wheezing  MUSCUSKELETAL:  no edema, dactylitis, entesitis      SHOULDERs: full ROM no tenderness  ELBOWS:  no tenderness no synovitis  WRISTS:   no tenderness no synovitis        MCPS:   no tenderness no synovitis  PIPS:    no tenderness no synovitis  DIPS: no tenderness no synovitis  KNEES:  no tenderness no synovitis              ANKLES:  no tenderness no synovitis           MTPS: There is diffuse tenderness of her MTPs without swelling  IP TOES: no tenderness no synovitis  SKIN: no rashes, skin changes, nail changes, no periungual erythema.           Impression/Plan:    1. Seronegative rheumatoid arthritis (HCC)  RF, CCP, and HLA-B27 negative    Initially diagnosed in [place] in 2018, she has been intolerant of several medications including methotrexate, sulfasalazine, and leflunomide mainly due to GI upset.  Discussed plan of trial of Plaquenil was had at the last visit, however patient never started due to fear of side effects.  She is now interested in starting treatment given recent flares over the last few months and concern for joint damage as a result.    Today I do not appreciate any synovitis, though she reports she is not in a flare.  I do not appreciate any psoriasis, rashes or nail pitting to suggest a spondyloarthropathy, thus I do favor seronegative RA, however her episodic nature is not classic for this, I would like to hopefully examine patient during a flare in order to further understand her underlying diagnosis.    For now I did discuss with the patient that Plaquenil is 1 of the lowest risk/least toxic medications for rheumatoid arthritis, and I would " suggest a trial of this to determine if her flares decrease or resolve.  We will repeat labs and update baseline x-rays as below prior to next visit.  We did discuss that it may take weeks to months to get full effect of the Plaquenil and we will follow-up her response at the next visit and determine further work-up based on this and results of below.    Plan  -Start hydroxychloroquine (PLAQUENIL) 200 MG Tab; Take 1.5 Tabs by mouth every day.  Dispense: 135 Tab; Refill: 1  -Start meloxicam (MOBIC) 15 MG tablet; Take 1 Tab by mouth every day. As needed for pain  Dispense: 90 Tab; Refill: 1  - CBC WITH DIFFERENTIAL; Future  - Comp Metabolic Panel; Future  - WESTERGREN SED RATE; Future  - CRP QUANTITIVE (NON-CARDIAC); Future  - DX-JOINT SURVEY-HANDS SINGLE VIEW; Future  - DX-JOINT SURVEY-FEET SINGLE VIEW; Future      2. Long-term use of Plaquenil  Risks of plaquenil reviewed including rash, headache, stomach upset and need for annual retinal monitoring, she has seen an eye doctor in the past who she will return to  Repeat CBC, CMP prior to next visit in every 6 months while on Plaquenil        3. Low serum complement C4  History of low C4, and weakly positive FANY 1:80 speckled  SSA and SSB are negative  She does report a rash, however is not photosensitive, and occasional oral ulcers.  She does also report dry eyes and dry mouth.  We will repeat labs as below, may consider referral to ENT for salivary gland biopsy if feels sicca symptoms are worsening.    Plan  - COMPLEMENT C3; Future  - COMPLEMENT C4; Future  - FANY IGG DIONTE W/RFLX TO FANY IGG IFA; Future      Patient will be in contact with me for any questions or concerns, will otherwise follow up with me as scheduled in 4 months.    This note was generated using voice recognition software which has a small chance of producing errors of grammar and possibly content.  I have made every reasonable attempt to find and correct any obvious errors, but expect that some may  not be found prior to finalization of this note.         Any Diaz MD

## 2019-07-23 DIAGNOSIS — M06.00 SERONEGATIVE RHEUMATOID ARTHRITIS (HCC): ICD-10-CM

## 2019-07-23 NOTE — TELEPHONE ENCOUNTER
Was the patient seen in the last year in this department? Yes last labs 6/28/18 in media    Does patient have an active prescription for medications requested? No     Received Request Via: Patient     Pt has switched pharmacy and its current updated in the chart

## 2019-07-29 RX ORDER — HYDROXYCHLOROQUINE SULFATE 200 MG/1
300 TABLET, FILM COATED ORAL DAILY
Qty: 135 TAB | Refills: 0 | Status: SHIPPED | OUTPATIENT
Start: 2019-07-29 | End: 2019-10-10 | Stop reason: SDUPTHER

## 2019-07-29 RX ORDER — MELOXICAM 15 MG/1
15 TABLET ORAL DAILY
Qty: 90 TAB | Refills: 0 | Status: SHIPPED | OUTPATIENT
Start: 2019-07-29 | End: 2019-10-10 | Stop reason: SDUPTHER

## 2019-07-30 NOTE — TELEPHONE ENCOUNTER
I sent patient message through [facility] to advise that refills have been sent to pharmacy and reminder to do labs a week before next appointment.

## 2019-10-10 ENCOUNTER — HOSPITAL ENCOUNTER (OUTPATIENT)
Dept: RADIOLOGY | Facility: MEDICAL CENTER | Age: 24
End: 2019-10-10
Attending: INTERNAL MEDICINE
Payer: COMMERCIAL

## 2019-10-10 ENCOUNTER — OFFICE VISIT (OUTPATIENT)
Dept: RHEUMATOLOGY | Facility: MEDICAL CENTER | Age: 24
End: 2019-10-10
Payer: COMMERCIAL

## 2019-10-10 ENCOUNTER — HOSPITAL ENCOUNTER (OUTPATIENT)
Dept: LAB | Facility: MEDICAL CENTER | Age: 24
End: 2019-10-10
Attending: INTERNAL MEDICINE
Payer: COMMERCIAL

## 2019-10-10 VITALS
OXYGEN SATURATION: 98 % | BODY MASS INDEX: 20.21 KG/M2 | SYSTOLIC BLOOD PRESSURE: 102 MMHG | HEART RATE: 60 BPM | TEMPERATURE: 97.8 F | HEIGHT: 69 IN | DIASTOLIC BLOOD PRESSURE: 62 MMHG | WEIGHT: 136.47 LBS

## 2019-10-10 DIAGNOSIS — Z79.1 ENCOUNTER FOR LONG-TERM (CURRENT) USE OF NSAIDS: ICD-10-CM

## 2019-10-10 DIAGNOSIS — Z79.899 LONG-TERM USE OF PLAQUENIL: ICD-10-CM

## 2019-10-10 DIAGNOSIS — R77.8 LOW SERUM COMPLEMENT C4: ICD-10-CM

## 2019-10-10 DIAGNOSIS — M06.00 SERONEGATIVE RHEUMATOID ARTHRITIS (HCC): Primary | ICD-10-CM

## 2019-10-10 DIAGNOSIS — M06.00 SERONEGATIVE RHEUMATOID ARTHRITIS (HCC): ICD-10-CM

## 2019-10-10 LAB
ALBUMIN SERPL BCP-MCNC: 4.3 G/DL (ref 3.2–4.9)
ALBUMIN/GLOB SERPL: 1.6 G/DL
ALP SERPL-CCNC: 55 U/L (ref 30–99)
ALT SERPL-CCNC: 13 U/L (ref 2–50)
ANION GAP SERPL CALC-SCNC: 9 MMOL/L (ref 0–11.9)
AST SERPL-CCNC: 18 U/L (ref 12–45)
BASOPHILS # BLD AUTO: 1.6 % (ref 0–1.8)
BASOPHILS # BLD: 0.06 K/UL (ref 0–0.12)
BILIRUB SERPL-MCNC: 0.7 MG/DL (ref 0.1–1.5)
BUN SERPL-MCNC: 12 MG/DL (ref 8–22)
C3 SERPL-MCNC: 109 MG/DL (ref 87–200)
C4 SERPL-MCNC: 13 MG/DL (ref 19–52)
CALCIUM SERPL-MCNC: 9.1 MG/DL (ref 8.5–10.5)
CHLORIDE SERPL-SCNC: 104 MMOL/L (ref 96–112)
CO2 SERPL-SCNC: 26 MMOL/L (ref 20–33)
CREAT SERPL-MCNC: 0.72 MG/DL (ref 0.5–1.4)
CRP SERPL HS-MCNC: 0.05 MG/DL (ref 0–0.75)
EOSINOPHIL # BLD AUTO: 0.15 K/UL (ref 0–0.51)
EOSINOPHIL NFR BLD: 4.1 % (ref 0–6.9)
ERYTHROCYTE [DISTWIDTH] IN BLOOD BY AUTOMATED COUNT: 40.6 FL (ref 35.9–50)
ERYTHROCYTE [SEDIMENTATION RATE] IN BLOOD BY WESTERGREN METHOD: <1 MM/HOUR (ref 0–20)
GLOBULIN SER CALC-MCNC: 2.7 G/DL (ref 1.9–3.5)
GLUCOSE SERPL-MCNC: 78 MG/DL (ref 65–99)
HCT VFR BLD AUTO: 39.8 % (ref 37–47)
HGB BLD-MCNC: 13.2 G/DL (ref 12–16)
IMM GRANULOCYTES # BLD AUTO: 0 K/UL (ref 0–0.11)
IMM GRANULOCYTES NFR BLD AUTO: 0 % (ref 0–0.9)
LYMPHOCYTES # BLD AUTO: 1.2 K/UL (ref 1–4.8)
LYMPHOCYTES NFR BLD: 32.7 % (ref 22–41)
MCH RBC QN AUTO: 30.6 PG (ref 27–33)
MCHC RBC AUTO-ENTMCNC: 33.2 G/DL (ref 33.6–35)
MCV RBC AUTO: 92.3 FL (ref 81.4–97.8)
MONOCYTES # BLD AUTO: 0.31 K/UL (ref 0–0.85)
MONOCYTES NFR BLD AUTO: 8.4 % (ref 0–13.4)
NEUTROPHILS # BLD AUTO: 1.95 K/UL (ref 2–7.15)
NEUTROPHILS NFR BLD: 53.2 % (ref 44–72)
NRBC # BLD AUTO: 0 K/UL
NRBC BLD-RTO: 0 /100 WBC
PLATELET # BLD AUTO: 272 K/UL (ref 164–446)
PMV BLD AUTO: 11.5 FL (ref 9–12.9)
POTASSIUM SERPL-SCNC: 4 MMOL/L (ref 3.6–5.5)
PROT SERPL-MCNC: 7 G/DL (ref 6–8.2)
RBC # BLD AUTO: 4.31 M/UL (ref 4.2–5.4)
SODIUM SERPL-SCNC: 139 MMOL/L (ref 135–145)
WBC # BLD AUTO: 3.7 K/UL (ref 4.8–10.8)

## 2019-10-10 PROCEDURE — 80053 COMPREHEN METABOLIC PANEL: CPT

## 2019-10-10 PROCEDURE — 85025 COMPLETE CBC W/AUTO DIFF WBC: CPT

## 2019-10-10 PROCEDURE — 86140 C-REACTIVE PROTEIN: CPT

## 2019-10-10 PROCEDURE — 36415 COLL VENOUS BLD VENIPUNCTURE: CPT

## 2019-10-10 PROCEDURE — 86038 ANTINUCLEAR ANTIBODIES: CPT

## 2019-10-10 PROCEDURE — 77077 JOINT SURVEY SINGLE VIEW: CPT

## 2019-10-10 PROCEDURE — 86160 COMPLEMENT ANTIGEN: CPT | Mod: 91

## 2019-10-10 PROCEDURE — 99214 OFFICE O/P EST MOD 30 MIN: CPT | Performed by: INTERNAL MEDICINE

## 2019-10-10 PROCEDURE — 85652 RBC SED RATE AUTOMATED: CPT

## 2019-10-10 RX ORDER — MELOXICAM 15 MG/1
15 TABLET ORAL DAILY
Qty: 180 TAB | Refills: 0 | Status: SHIPPED | OUTPATIENT
Start: 2019-10-10 | End: 2021-05-14

## 2019-10-10 RX ORDER — HYDROXYCHLOROQUINE SULFATE 200 MG/1
300 TABLET, FILM COATED ORAL DAILY
Qty: 270 TAB | Refills: 0 | Status: SHIPPED | OUTPATIENT
Start: 2019-10-10 | End: 2021-05-14

## 2019-10-10 NOTE — PROGRESS NOTES
RHEUMATOLOGY CLINIC FOLLOW UP NOTE        Chief complaint:  Follow up seronegative RA        HPI:  22 y/o F with seronegative RA diagnosed in [place] presents today for follow up,  last seen by me 6/2019.    At the last visit with Dr. Andre, she was ordered to start plaquenil however never did so due to fear of side effects.  She reports that she actually went flare free until starting in February of this year. We started Plaquenil at her last visit at 300mg daily. Also started Meloxicam daily as needed.  She reports significant improvement in all of her pain since starting the Plaquenil, reports about an 80% improvement.  She has only had one episode of the beginning of flare, however this only lasted a day and then resolved.  She is overall feeling much better and tolerating the medicine without rashes, headaches or GI upset.  She denies any oral ulcers.  No fevers or infections.  She does continue to have dry eyes and dry mouth.    She just received labs and xrays today with results pending.    She is informed me today that she is moving to [place] at the end of the month.  She is going to any new health insurance and establish with a new health care team there but does not know who she is going to see yet.                   Rheum Background:  Per Dr. Andre's notes:  22 y.o. female presenting with a diagnosis of seronegative rheumatoid arthritis diagnosed in [place].  She was started on leflunomide and reports being on this medication for about 3 months.  She notes improvement with decrease swelling and decrease episodes of flares..  SHe reports sicca symptoms, polyarthralgia and oral ulcers.     Ms. Villalba states she had noticed b/l hand, wrist and knee pain since when she was in high school.   Her symptoms have worsened. Thus, she was evaluated by rheumatologist in [place] in 1/2018 and was diagnosed with seronegative RA.  She tried Methotrexate and didn't tolerate MTX.  She didn't tolerate Sulfasalazine due to GI  adverse effect.  She didn't want to try Plaquenil because of adverse effect of vision changes.   She was started on Leflunomide around 3/2018.  She feels that leflunamide is helping with her symptoms.  Though, she still has significant joint pain.  She states that she gets swelling and pain of b/l MCP and PIP joints and toes symmetrically.  She also c/o b/l knee pain.  She also reports mid thoracic back and neck pain which is worse at night when she is not moving.  She reports morning stiffness lasting about 30-60 min.  She reports 4 flare ups between March and May with her last flare up being the most severe where she had diffuse pain, stiffness and difficulty with mobility.   She was treated with Prednisone and last dose of Prednisone was week of 5/11.  She reports intermittent oral sore every few months and petechia like rash on her arms b/l.   She has sicca symptoms.    She has a h/o IBS.  She states that she sometimes gets bloody diarrhea.  She was evaluated by GI with EGD and Colonoscopy in the past.  Patient states work up was negative except for GERD which she attributes it to drinking etoh and consuming spicy food.   She stopped drinking and cut down on spicy food.   She is currently on whole 30 diet that has low antiinflammatory diet.       In review of records from [facility] she first presented to rheumatology to see Maite Quiles MD on February 16, 2018 and at that time had complained of pain in her fingers and knees.  She had reported that she had shooting pains in her knees even death as a child.  She also reports dry eyes and dry throat and dry skin.  And also increased fatigue.  Patient also notes canker sores since childhood as well.  At the initial exam there is discomfort of the MCPs and PIPs but no acute swelling.  PIPs also has a joint appearance of mild arthritis.  Otherwise no swelling of the wrists or DIPs or elbows.  There is also noted upper back discomfort at the level of  "the scapula on the right and right mid back pain.  Labs at that time did demonstrate that she had a positive FANY speckled pattern and C4 was slightly low at 13.     In follow-up she was noted to not respond to ibuprofen and she had morning symptoms of pain and tightness.  She was given a trial of methotrexate this was her last visit note that we have.    Patient reports that that her hands, wrists, knees and toes are the most affected.  She will only notice swelling of her fingers and toes.  She will get \"flares\" that last about 1 week, has had 3 flares over the last few months.  She takes naproxen which varma snot help.  She reports that alcohol and CBD do help. She has been given prednisone in the past, she develops significant anger and does not like how it makes her feels, maybe helps her joints.  Does get a rash on her inner forerarms that can last 1-2 days, this is to always associated with flares. She has tried SSZ, MTX and leflunomide and reports the side effects were too bad to continue.  She also reports that naproxen does not help her much.     Maternal aunt with JRA.  No personal or family hx of psoriasis.     She has issues with diarrhea, there is sometimes blood.  She has had a colonoscopy in 2017 with no IBD found.     She is sexually active with men, has an appt for IUD placement June 27th.       Pertinent Labs:  Lyme antibodies were negative 8/2/2017  TSH 1.68  8/2/2017  CCP negative  8/2/2017  EBV IgM was negative  8/2/2017  EBV IgG elevated at 361 (>21.99 is positive)  Ferritin = 24 8/2/2017  AMSA = negative  stritated muscle antibody screening negative  Anti myocardial antibody is negative  dsDNA by crithidia method is negative 8/2/2017  C3C = 109   8/2/2017  C4c = 13 (16-47) 8/2/2017  Ribosomal antibody is negative  8/2/2017  scl 70 antibody is negative 8/2/2017  Sm/RNP antibody is negative  8/2/2017  SSB antibody is negative  8/2/2017  SSA antibody is negative 8/2/2017  Rheumatoid factor is " negative 8/2/2017  FANY 1:80 titer speckled  8/2/2017  Sm antibody is negative 8/2/2017  H pylori breath test was negative 8/2/2017   IgA serum 153 ()  4/30/2016  TRansglulaminase IgA was negative 4/30/2016  HLA-B27 antigen was negative on 6/28/2018  Antihistone antibody was negative on 6/28/2018  Thyroid peroxidase and thyroglobulin antibodies were negative on 6/28/2018  G6PD Enzyme was 15.9 (7.0-20.5)  Anti-double-stranded DNA was negative  SCL 70 antibody was negative  Centromere antibody was negative on 6/28/2018  Hepatitis B surface antigen was nonreactive on 6/28/2018  Hepatitis C antibody was negative on 6/28/2018  Hepatitis B core antibody was nonreactive   Quantiferon negative 7/2018        Past Medical HIstory: GERD, IBS with diarrhea, bilateral wrist pain, diagnosed with seronegative rheumatoid arthritis, no miscarriages and no blood clot history     Family History: autn has rheumatoid arthirtis, father has thyroid disease, maternal history of breast cancer family history of hypertension and paternal side had thyroid disease.  Older sister committed suicide in 2016 jumped off a bridge in [place].     Social History: rarely drinks alcohol, does not smoke cigarrettes uses CBD Liquid, smokes marijuana, on whole 30 diet        Previous Medication  Leflunomide  Methotrexate - did not make her feel well  Sulfasalazine  - cause GI problem              ROS:    GEN: denies fevers  ENT: denies change in vision, denies oral ulcers  Heme/Onc: no history of cancers or hematologic abnormalities  Skin: no rashes currently  MS: Per HPI            OUTPATIENT MEDS:    Prior to Admission medications    Medication Sig Start Date End Date Taking? Authorizing Provider   hydroxychloroquine (PLAQUENIL) 200 MG Tab Take 1.5 Tabs by mouth every day. 7/29/19   Any Diaz M.D.   meloxicam (MOBIC) 15 MG tablet Take 1 Tab by mouth every day. As needed for pain 7/29/19   Any Diaz M.D.   predniSONE (DELTASONE) 5 MG Tab Take 5  "mg by mouth every day.    Physician Outpatient   vitamin D (CHOLECALCIFEROL) 1000 UNIT Tab Take 1,000 Units by mouth every day.    Physician Outpatient   VITAMIN K PO Take  by mouth.    Physician Outpatient   [place]-3 Fish Oil (OMEGA 3 FATTY ACIDS) 1000 MG Cap Take 1,000 mg by mouth 3 times a day, with meals.    Physician Outpatient   Borage, Borago officinalis, (BORAGE OIL PO) Take  by mouth.    Physician Outpatient   Multiple Vitamins-Minerals (MULTIVITAMIN ADULT PO) Take  by mouth.    Physician Outpatient           Past Medical History:   Diagnosis Date   • ASTHMA             reports that she has never smoked. She has never used smokeless tobacco. She reports that she does not drink alcohol or use drugs.             Physical Exam:     /62 (BP Location: Right arm, Patient Position: Sitting, BP Cuff Size: Adult)   Pulse 60   Temp 36.6 °C (97.8 °F) (Temporal)   Ht 1.753 m (5' 9\")   Wt 61.9 kg (136 lb 7.4 oz)   SpO2 98%   BMI 20.15 kg/m²         GEN: well-appearing, NAD  Head: no focal alopecia, no hair thinning  ENT: no conjunctival icterus or injection  Pulm: normal chest expansion, speaking in full sentences  MUSCUSKELETAL:  no edema, dactylitis, entesitis      SHOULDERs: full ROM no tenderness  ELBOWS:  no tenderness no synovitis  WRISTS:   no tenderness no synovitis  CMC JOINT:  R normal L normal          MCPS:   Mild pain of right second MCP without swelling  PIPS:   no tenderness no synovitis, able to make full fist and bury fingers  DIPS: no tenderness no synovitis  KNEES:  no tenderness no synovitis              ANKLES:  no tenderness no synovitis           SKIN: no rashes, skin changes, nail changes, no periungual erythema.               Labs:    Results for orders placed or performed during the hospital encounter of 07/14/18   TB TEST CELL IMM MEASURE AG (QUANTIFERON)   Result Value Ref Range    Nil 0.026     TB Ag-Nil 0.004     Mitogen-Nil 41.796     Quantiferon TB Gold Negative Negative "         Impression/Plan:  1. Seronegative rheumatoid arthritis (HCC)  RF, CCP, and HLA-B27 negative     Initially diagnosed in [place] in 2018, she has been intolerant of several medications including methotrexate, sulfasalazine, and leflunomide mainly due to GI upset. I do not appreciate any psoriasis, rashes or nail pitting to suggest a spondyloarthropathy, thus I do favor seronegative RA, however her episodic nature is not classic for this.  She is significantly responding to Plaquenil with about 80% improvement in her symptoms overall.  Meloxicam is helping as needed for pain however she is requiring this rarely. Tolerating regimen.    Patient is moving to [place] at the end of the month and plans to establish with new primary care and rheumatologist there.      Plan  -Continue hydroxychloroquine (PLAQUENIL) 200 MG Tab; Take 1.5 Tabs by mouth every day  -Continue meloxicam (MOBIC) 15 MG tablet; Take 1 Tab by mouth every day. As needed for pain    -6 months of medications applied to the patient while she establishes care with new healthcare team in [place]  -Patient will go to medical records to have MAYELA filled out to send medical records to herself and bring to her new PCP and rheumatologist  - Labs and x-rays are pending from today and I will alert her with the results        2. Long-term use of Plaquenil  Risks of plaquenil reviewed including rash, headache, stomach upset and need for annual retinal monitoring,  Patient will establish care with an ophthalmologist in [place]  Recommend CBC, CMP every 6 months while on Plaquenil and meloxicam           3. Low serum complement C4  History of low C4, and weakly positive FANY 1:80 speckled  SSA and SSB are negative  She does report a rash, however is not photosensitive, and occasional oral ulcers.  She does also report dry eyes and dry mouth.  We did repeat labs as below, may consider referral to ENT for salivary gland biopsy if feels sicca symptoms are worsening, this  would need to be done in [place] with new rheumatologist at this time.     Plan  Follow-up labs as below and will alert patient of results  - COMPLEMENT C3; Future  - COMPLEMENT C4; Future  - FANY IGG DIONTE W/RFLX TO FANY IGG IFA; Future                    Patient will be in contact with me for any questions or concerns, will otherwise follow up with new health care team in [place] as she is moving.    This note was generated using voice recognition software which has a small chance of producing errors of grammar and possibly content.  I have made every reasonable attempt to find and correct any obvious errors, but expect that some may not be found prior to finalization of this note.           Any Diaz MD

## 2019-10-12 LAB — NUCLEAR IGG SER QL IA: NORMAL

## 2019-10-29 ENCOUNTER — TELEPHONE (OUTPATIENT)
Dept: NEUROLOGY | Facility: MEDICAL CENTER | Age: 24
End: 2019-10-29

## 2019-10-29 NOTE — TELEPHONE ENCOUNTER
I spoke with patient and explained results. She states understanding and does not have any questions.

## 2019-10-29 NOTE — TELEPHONE ENCOUNTER
Please inform the patient her labs were negative for lupus but Sjogren's may still be considered and a salivary gland biopsy may be considered for further evaluation of this (this causes dry eyes and dry mouth).  The biopsy is usually done with ENT and can be arranged with her new rheumatologist in [place]. Plaquenil is a good medicine for Sjogren's as well and her arthritis as it may all be related.      Xrays are normal

## 2019-10-29 NOTE — TELEPHONE ENCOUNTER
1. Caller Name: Amalia Villalba                                         Call Back Number: [phone]      Patient approves a detailed voicemail message: no and N\A    Patient called and would like to know results of labs and xray, she would like to know the likelihood of Lupus. Please advise. I will call patient to advise. She no longer has access to [employer].     Routed to Dr. Diaz for review.

## 2019-12-31 ENCOUNTER — TELEPHONE (OUTPATIENT)
Dept: RHEUMATOLOGY | Facility: MEDICAL CENTER | Age: 24
End: 2019-12-31

## 2019-12-31 NOTE — TELEPHONE ENCOUNTER
Patient called and LM asking for refill of medication. She says that she has not established with a new rheumatologist in [place] yet and would like a refill sent to [facility] at [id number], [place] [address].    I called patient and LM asking for clarification on which medication she would like refilled and which pharmacy, as I was unable to locate the pharmacy she noted. I was able to locate [facility] (same as [facility] on [address]) in [employer] [facility] [place]. I asked pt to either [employer] message or call me back with this information.

## 2020-01-01 NOTE — TELEPHONE ENCOUNTER
Analisa received from patient. She states that her PCP was able to refill her medication and she would like to cancel her request to our office. She also states that she plans to move back to the [facility] next year and would like to know if she can continue treatment or if she can be put on a waiting list.

## 2020-12-18 ENCOUNTER — HOSPITAL ENCOUNTER (EMERGENCY)
Facility: MEDICAL CENTER | Age: 25
End: 2020-12-21
Attending: EMERGENCY MEDICINE

## 2020-12-18 DIAGNOSIS — Z72.89 DELIBERATE SELF-CUTTING: ICD-10-CM

## 2020-12-18 DIAGNOSIS — R45.851 SUICIDAL IDEATION: ICD-10-CM

## 2020-12-18 LAB — POC BREATHALIZER: 0.14 PERCENT (ref 0–0.01)

## 2020-12-18 PROCEDURE — 90715 TDAP VACCINE 7 YRS/> IM: CPT | Performed by: EMERGENCY MEDICINE

## 2020-12-18 PROCEDURE — 700111 HCHG RX REV CODE 636 W/ 250 OVERRIDE (IP): Performed by: EMERGENCY MEDICINE

## 2020-12-18 PROCEDURE — 99285 EMERGENCY DEPT VISIT HI MDM: CPT

## 2020-12-18 PROCEDURE — A9270 NON-COVERED ITEM OR SERVICE: HCPCS | Performed by: EMERGENCY MEDICINE

## 2020-12-18 PROCEDURE — 302970 POC BREATHALIZER

## 2020-12-18 PROCEDURE — 302970 POC BREATHALIZER: Performed by: EMERGENCY MEDICINE

## 2020-12-18 PROCEDURE — 90471 IMMUNIZATION ADMIN: CPT

## 2020-12-18 PROCEDURE — 700102 HCHG RX REV CODE 250 W/ 637 OVERRIDE(OP): Performed by: EMERGENCY MEDICINE

## 2020-12-18 RX ORDER — LORAZEPAM 1 MG/1
1 TABLET ORAL ONCE
Status: COMPLETED | OUTPATIENT
Start: 2020-12-18 | End: 2020-12-18

## 2020-12-18 RX ADMIN — CLOSTRIDIUM TETANI TOXOID ANTIGEN (FORMALDEHYDE INACTIVATED), CORYNEBACTERIUM DIPHTHERIAE TOXOID ANTIGEN (FORMALDEHYDE INACTIVATED), BORDETELLA PERTUSSIS TOXOID ANTIGEN (GLUTARALDEHYDE INACTIVATED), BORDETELLA PERTUSSIS FILAMENTOUS HEMAGGLUTININ ANTIGEN (FORMALDEHYDE INACTIVATED), BORDETELLA PERTUSSIS PERTACTIN ANTIGEN, AND BORDETELLA PERTUSSIS FIMBRIAE 2/3 ANTIGEN 0.5 ML: 5; 2; 2.5; 5; 3; 5 INJECTION, SUSPENSION INTRAMUSCULAR at 22:58

## 2020-12-18 RX ADMIN — LORAZEPAM 1 MG: 1 TABLET ORAL at 22:58

## 2020-12-18 SDOH — HEALTH STABILITY: MENTAL HEALTH: HOW OFTEN DO YOU HAVE A DRINK CONTAINING ALCOHOL?: MONTHLY OR LESS

## 2020-12-18 ASSESSMENT — FIBROSIS 4 INDEX: FIB4 SCORE: 0.5

## 2020-12-19 LAB
AMPHET UR QL SCN: NEGATIVE
BARBITURATES UR QL SCN: NEGATIVE
BENZODIAZ UR QL SCN: NEGATIVE
BZE UR QL SCN: POSITIVE
CANNABINOIDS UR QL SCN: NEGATIVE
HCG UR QL: NEGATIVE
METHADONE UR QL SCN: NEGATIVE
OPIATES UR QL SCN: NEGATIVE
OXYCODONE UR QL SCN: NEGATIVE
PCP UR QL SCN: NEGATIVE
POC BREATHALIZER: 0.07 PERCENT (ref 0–0.01)
PROPOXYPH UR QL SCN: NEGATIVE

## 2020-12-19 PROCEDURE — 81025 URINE PREGNANCY TEST: CPT

## 2020-12-19 PROCEDURE — 700102 HCHG RX REV CODE 250 W/ 637 OVERRIDE(OP): Performed by: PSYCHIATRY & NEUROLOGY

## 2020-12-19 PROCEDURE — 302970 POC BREATHALIZER: Performed by: EMERGENCY MEDICINE

## 2020-12-19 PROCEDURE — A9270 NON-COVERED ITEM OR SERVICE: HCPCS | Performed by: EMERGENCY MEDICINE

## 2020-12-19 PROCEDURE — 700102 HCHG RX REV CODE 250 W/ 637 OVERRIDE(OP): Performed by: EMERGENCY MEDICINE

## 2020-12-19 PROCEDURE — 99284 EMERGENCY DEPT VISIT MOD MDM: CPT | Performed by: PSYCHIATRY & NEUROLOGY

## 2020-12-19 PROCEDURE — 80307 DRUG TEST PRSMV CHEM ANLYZR: CPT

## 2020-12-19 PROCEDURE — 90791 PSYCH DIAGNOSTIC EVALUATION: CPT

## 2020-12-19 PROCEDURE — A9270 NON-COVERED ITEM OR SERVICE: HCPCS | Performed by: PSYCHIATRY & NEUROLOGY

## 2020-12-19 RX ORDER — LORAZEPAM 1 MG/1
1 TABLET ORAL ONCE
Status: COMPLETED | OUTPATIENT
Start: 2020-12-19 | End: 2020-12-19

## 2020-12-19 RX ORDER — RISPERIDONE 2 MG/1
2 TABLET ORAL EVERY EVENING
Status: DISCONTINUED | OUTPATIENT
Start: 2020-12-19 | End: 2020-12-21 | Stop reason: HOSPADM

## 2020-12-19 RX ORDER — MIRTAZAPINE 15 MG/1
7.5 TABLET, FILM COATED ORAL
Status: DISCONTINUED | OUTPATIENT
Start: 2020-12-19 | End: 2020-12-21 | Stop reason: HOSPADM

## 2020-12-19 RX ORDER — LORAZEPAM 2 MG/1
2 TABLET ORAL
Status: DISCONTINUED | OUTPATIENT
Start: 2020-12-19 | End: 2020-12-21 | Stop reason: HOSPADM

## 2020-12-19 RX ORDER — LORAZEPAM 2 MG/ML
2 INJECTION INTRAMUSCULAR
Status: DISCONTINUED | OUTPATIENT
Start: 2020-12-19 | End: 2020-12-21 | Stop reason: HOSPADM

## 2020-12-19 RX ORDER — HALOPERIDOL 5 MG/ML
5 INJECTION INTRAMUSCULAR
Status: DISCONTINUED | OUTPATIENT
Start: 2020-12-19 | End: 2020-12-21 | Stop reason: HOSPADM

## 2020-12-19 RX ORDER — HALOPERIDOL 5 MG/1
5 TABLET ORAL
Status: DISCONTINUED | OUTPATIENT
Start: 2020-12-19 | End: 2020-12-21 | Stop reason: HOSPADM

## 2020-12-19 RX ORDER — DIPHENHYDRAMINE HYDROCHLORIDE 50 MG/ML
50 INJECTION INTRAMUSCULAR; INTRAVENOUS
Status: DISCONTINUED | OUTPATIENT
Start: 2020-12-19 | End: 2020-12-21 | Stop reason: HOSPADM

## 2020-12-19 RX ORDER — DIPHENHYDRAMINE HCL 25 MG
50 TABLET ORAL
Status: DISCONTINUED | OUTPATIENT
Start: 2020-12-19 | End: 2020-12-21 | Stop reason: HOSPADM

## 2020-12-19 RX ORDER — RISPERIDONE 1 MG/1
1 TABLET ORAL EVERY MORNING
Status: DISCONTINUED | OUTPATIENT
Start: 2020-12-20 | End: 2020-12-21 | Stop reason: HOSPADM

## 2020-12-19 RX ADMIN — LORAZEPAM 1 MG: 1 TABLET ORAL at 10:38

## 2020-12-19 RX ADMIN — RISPERIDONE 2 MG: 2 TABLET ORAL at 19:36

## 2020-12-19 RX ADMIN — MIRTAZAPINE 7.5 MG: 15 TABLET, FILM COATED ORAL at 22:16

## 2020-12-19 NOTE — ED TRIAGE NOTES
"Amalia Villalba   25 y.o. female   Chief Complaint   Patient presents with   • Suicide Attempt      The patient presents to the ED via EMS for evaluation following a suicide attempt. The patient reports that she used a piece of a broken mirror in an attempt to cut her left wrist and forearm. Superficial abrasion present. She states that she has been struggling during the pandemic with employment and has also experienced the recent death of her father. She states that her sister committed suicide in 2014 and that it is something that is always on her mind.     Patient also states that she could potentially be pregnant and would like to have a test run.     She endorses alcohol this evening. Denies elicit drug use. Endorses SI. Denies HI or any visual, auditory or tactile hallucinations.     The patient denies knowingly being around anyone with suspected or confirmed COVID 19.    /80   Pulse 89   Temp 36.6 °C (97.8 °F) (Tympanic)   Resp 20   Ht 1.753 m (5' 9\")   Wt 59 kg (130 lb)   LMP 11/01/2020 (Approximate)   SpO2 98%   Breastfeeding No   BMI 19.20 kg/m²       "

## 2020-12-19 NOTE — ED NOTES
"Pt awake, denies SI?HI. Superficial abrasions noted to LFA. Pt states she has been getting kicked out of bars but \"I don't know why, I'm a calm, quiet lady. States, \"people wanting me to pay for videos so they don't get out. They are fucking with me, trying to make me think I'm fucking crazy\". Pt given contacts and pt  put in.   "
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
## 2020-12-19 NOTE — ED NOTES
Pharmacy Medication Reconciliation      Medication reconciliation updated and complete per pt at bedside  Allergies have been verified and updated   No oral ABX within the last 14 days      Pt reports she has not taken any medications in over a month

## 2020-12-19 NOTE — DISCHARGE PLANNING
Medical Social Work    Referral: Legal Hold    Intervention: Legal Hold Paperwork given to SW by [facility] RN Laure    Legal Hold Initiated: Date: 12/18/2020 Time: 2215    Patient’s Insurance Listed on Face Sheet: None    Referrals sent to: [facility]    This referral contains the following information:  1) Face sheet _x___  2) Page 1 and Page 2 of Legal Hold _x___  3) Alert Team Assessment/Psych Assessment __x__  4) Head to toe physical exam __x__  5) Urine Drug Screen __x__  6) Blood Alcohol __x__  7) Vital signs __x__  8) Pregnancy test when applicable __x_  9) Medications list __x_    Plan: Patient will transfer to mental health facility once acceptance is obtained

## 2020-12-19 NOTE — DISCHARGE PLANNING
Alert Team    Alert Team notified of pending mental health evaluation; at this time the patient is currently intoxicated (alcohol), the breathalyzer result shows 0.142 at 2217. Alert Team to continue to monitor and will patient ASAP once she is legally sober.
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
## 2020-12-19 NOTE — ED NOTES
Patient's home medications have been reviewed by the pharmacy team.     Past Medical History:   Diagnosis Date   • ASTHMA        Patient's Medications   New Prescriptions    No medications on file   Previous Medications    HYDROXYCHLOROQUINE (PLAQUENIL) 200 MG TAB    Take 1.5 Tabs by mouth every day.    MELOXICAM (MOBIC) 15 MG TABLET    Take 1 Tab by mouth every day. As needed for pain, always take with food   Modified Medications    No medications on file   Discontinued Medications    BORAGE, BORAGO OFFICINALIS, (BORAGE OIL PO)    Take  by mouth.    MULTIPLE VITAMINS-MINERALS (MULTIVITAMIN ADULT PO)    Take  by mouth.    OMEGA-3 FISH OIL (OMEGA 3 FATTY ACIDS) 1000 MG CAP    Take 1,000 mg by mouth 3 times a day, with meals.    VITAMIN D (CHOLECALCIFEROL) 1000 UNIT TAB    Take 1,000 Units by mouth every day.    VITAMIN K PO    Take  by mouth.          A:  Medications do not appear to be contributing to current complaints.     P:    No recommendations at this time. Patient has been evaluated by psych and started on an appropriate regimen.     Saul Hoffman, PharmD
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
## 2020-12-19 NOTE — ED NOTES
Pt resting in bed eyes, closed.  When RN said her name only stirred. Did not wake up RN will hold medications till PT awake enough to take them.

## 2020-12-20 PROCEDURE — 700102 HCHG RX REV CODE 250 W/ 637 OVERRIDE(OP): Performed by: PSYCHIATRY & NEUROLOGY

## 2020-12-20 PROCEDURE — A9270 NON-COVERED ITEM OR SERVICE: HCPCS | Performed by: PSYCHIATRY & NEUROLOGY

## 2020-12-20 RX ADMIN — RISPERIDONE 1 MG: 1 TABLET, FILM COATED ORAL at 05:22

## 2020-12-20 RX ADMIN — LORAZEPAM 2 MG: 2 TABLET ORAL at 21:11

## 2020-12-20 RX ADMIN — RISPERIDONE 2 MG: 2 TABLET ORAL at 19:35

## 2020-12-20 RX ADMIN — DIPHENHYDRAMINE HYDROCHLORIDE 50 MG: 25 TABLET ORAL at 23:46

## 2020-12-20 RX ADMIN — HALOPERIDOL 5 MG: 5 TABLET ORAL at 23:47

## 2020-12-20 RX ADMIN — MIRTAZAPINE 7.5 MG: 15 TABLET, FILM COATED ORAL at 21:11

## 2020-12-20 NOTE — ED NOTES
Pt updated on plan of care; provided personal toiletries and warm blankets; sitter in direct view of pt

## 2020-12-20 NOTE — ED NOTES
Patient resting comfortably. Chest rise and fall noted. Patient in direct observation of sitter. Will Continue to monitor. Updated on plan of care.

## 2020-12-20 NOTE — ED NOTES
Patient resting comfortably. Chest rise and fall noted.  Patient in direct observation of sitter. Will Continue to monitor.

## 2020-12-20 NOTE — ED NOTES
Received report and assumed care of pt. 1:1 sitter is outside of room watching pt. Pt is sleeping comfortably.

## 2020-12-20 NOTE — ED NOTES
Ambulatory to BR and back to room under observation of this RN. All needs met. Given breakfast tray.

## 2020-12-20 NOTE — ED PROVIDER NOTES
ED Provider Note Addendum    Scribed for Rupert Valadez M.D. by Luba Olivarez on 12/20/2020 at 7:20 AM.     This is an addendum to the note on Amalia Villalba.  For further details and full chart information, see patient's initial note.       6:00 AM - I discussed the patient's case with the night shift ERP who will transfer care of the patient to me at this time.        6:58 AM - Patient evaluated by myself. Patient is resting comfortably at this time with no complaints. She is easily aroused to verbal stimuli and is in no acute distress. Vital signs are stable. Her lungs are clear and heart sounds regular. We will continue to monitor pending transfer.      Luba ENRIQUE (Scribe), am scribing for, and in the presence of, Rupert Valadez M.D..    Electronically signed by: Luba Olivarez (Scribe), 12/20/2020    Rupert ENRIQUE M.D. personally performed the services described in this documentation, as scribed by Luba Olivarez in my presence, and it is both accurate and complete.    The note accurately reflects work and decisions made by me.  Rupert Valadez M.D.  12/20/2020  12:45 PM

## 2020-12-21 VITALS
WEIGHT: 130 LBS | HEART RATE: 126 BPM | BODY MASS INDEX: 19.26 KG/M2 | DIASTOLIC BLOOD PRESSURE: 93 MMHG | OXYGEN SATURATION: 98 % | HEIGHT: 69 IN | SYSTOLIC BLOOD PRESSURE: 134 MMHG | RESPIRATION RATE: 14 BRPM | TEMPERATURE: 98.7 F

## 2020-12-21 PROCEDURE — 700102 HCHG RX REV CODE 250 W/ 637 OVERRIDE(OP): Performed by: PSYCHIATRY & NEUROLOGY

## 2020-12-21 PROCEDURE — 99282 EMERGENCY DEPT VISIT SF MDM: CPT | Performed by: PSYCHIATRY & NEUROLOGY

## 2020-12-21 PROCEDURE — A9270 NON-COVERED ITEM OR SERVICE: HCPCS | Performed by: PSYCHIATRY & NEUROLOGY

## 2020-12-21 RX ADMIN — RISPERIDONE 1 MG: 1 TABLET, FILM COATED ORAL at 06:20

## 2020-12-21 NOTE — ED NOTES
Legal hold d/c'd by psychiatry.  Two bags of personal belongings returned to patient.  Alert team RN at bedside at this time.

## 2020-12-21 NOTE — DISCHARGE PLANNING
Alert Team:    Spoke with patient and re-oriented legal hold process. Explained psychiatrist recommendation to extend legal hold for further psychiatric inpatient stabilization. Discussed self-pay options due to uninsured status and patient requested this writer call her mother and request monetary assistance to transfer to facility. Called mother-Chelsey [phone] at patient's requested and updated on POC. Mother declined to assist with 8 thousand dollar upfront fee required for self-pay 5-day upfront psychiatric admission. Mother is requesting to speak with patient tomorrow to discuss alternatives due to the late hour. Will continue to monitor.

## 2020-12-21 NOTE — CONSULTS
PSYCHIATRIC FOLLOW-UP:(established)  *Reason for admission: acute suicidal ideations. Long-standing history of anxiety depression...  became more depressed after drinking some alcohol and use a knife to cut her left forearm. She states that this was an attempt to harm or even kill herself. Superficial scratches.       *Legal Hold Status: discontinued                *HPI: she has slept well, feels more rested and thinks its sleeping and meds which she is willing to continue. Not SI and is clear about it.  Thinks about getting an  regarding the video but when I suggest that she simply ignore it, no communication with the person at all, she says this sounds like a good idea.  Her intensity around her delusions has lessened, softened and less emphatic.            *Psychiatric Examination:   Vitals:   Vitals:    12/19/20 1850 12/20/20 1116 12/20/20 2035 12/21/20 0621   BP: 121/75 134/94 146/86 106/79   Pulse: 86 98 89 84   Resp: 16 16 14 16   Temp:  36.3 °C (97.3 °F) 37.2 °C (99 °F) 37.1 °C (98.7 °F)   TempSrc:  Oral Temporal    SpO2: 98% 98% 100% 98%   Weight:       Height:       General Appearance:  good eye contact     Abnormal Movements: none   Gait and Posture: within normal limits  Speech: wnl  Thought Process: normal rate  Associations: much more linear  Abnormal or Psychotic Thoughts: delusions improved  Judgement and Insight:improved    Orientation: grossly intact  Recent and Remote Memory: grossly intact  Attention Span and Concentration: able to focus  Language:spontaneous and fluent  Fund of Knowledge: not tested  Mood and Affect: mood congruent. Says she feels back to normal and denies depression, anxiety better  SI/HI:  none  MMSE score and/or clock drawing:not applicable      *ASSESSMENT/RECOMENDATIONS:  1. Psychotic disorder unspc: improved and stabilization  -strong possibility for bipolar katrina   -risperdol 1/2 mg: script in chart.  -remeron 7.5 mg: script in chart  -prns          2.  "Polysubstance use      -unclear how often, when....     3. Alcohol use disorder  -was daily, now isn't.....  -monitor for CIWA     3. Medical:  -none acutely     Legal hold: discontinued, pt has a therapist abd \"can get a psychiatrist\" but referrals just in case.   Observation status: routine  Privileges (while on legal hold): no restrictions      *Signing off    "

## 2020-12-21 NOTE — ED NOTES
Patient given discharge instructions, follow up information, and prescription, verbalized understanding, ambulatory out of ED w/steady gait.

## 2020-12-21 NOTE — DISCHARGE PLANNING
This 25y female pt. was discharged with family home. She denies any si, hi or psychosis. She was also discharged with op referrals and a safety crisis plan.

## 2020-12-21 NOTE — CONSULTS
[facility]  Crisis/Safety Plan    Name:  Amalia Villalba  MRN:  [id number]  Date:  2020    Warning signs that a crisis may be developing for me or I may be at risk:  1) having increase in stress and anxiety  2) becoming more depressed  3) developing any thoughts of self harm    Coping strategies I can use on my own (relaxation, physical activity, etc):  1) exercise daily  2) listen to music  3) talk to family/boyfriend and friends    Ways I can make my environment safe:  1) stay away from alcohol and street drugs  2) no firearms in your home  3) secure medications and sharps    Things I want to tell myself when I feel a crisis developin) try to sty calm   2) remember there is help out there  3) get help before a crisis develops    People I can contact for support or distraction (and their phone numbers):  1) Dad and Mom 362 2332  2) use numbers below  3)    If I’m not able to reach my support people, or the above strategies don’t help, I can contact the following professionals, agencies, or hotlines:  1) Crisis Call Center ():  3-816-091-0377 -OR- (650) [phone]  2) Crisis Text Line ():  Text CARE TO 786514  3)   4)     Nick Sheikh R.N.

## 2020-12-21 NOTE — ED NOTES
Report received from HAILEY Bates.  Patient resting in bed with eyes closed, even and unlabored respirations, awakens easily to voice, denies SI.

## 2021-05-14 ENCOUNTER — APPOINTMENT (OUTPATIENT)
Dept: RADIOLOGY | Facility: MEDICAL CENTER | Age: 26
End: 2021-05-14
Attending: EMERGENCY MEDICINE
Payer: COMMERCIAL

## 2021-05-14 ENCOUNTER — HOSPITAL ENCOUNTER (EMERGENCY)
Facility: MEDICAL CENTER | Age: 26
End: 2021-05-14
Attending: EMERGENCY MEDICINE
Payer: COMMERCIAL

## 2021-05-14 VITALS
HEIGHT: 69 IN | RESPIRATION RATE: 16 BRPM | HEART RATE: 70 BPM | BODY MASS INDEX: 18.32 KG/M2 | TEMPERATURE: 97.4 F | OXYGEN SATURATION: 95 % | DIASTOLIC BLOOD PRESSURE: 112 MMHG | WEIGHT: 123.68 LBS | SYSTOLIC BLOOD PRESSURE: 158 MMHG

## 2021-05-14 DIAGNOSIS — R10.30 LOWER ABDOMINAL PAIN: ICD-10-CM

## 2021-05-14 LAB
ALBUMIN SERPL BCP-MCNC: 4.4 G/DL (ref 3.2–4.9)
ALBUMIN/GLOB SERPL: 1.3 G/DL
ALP SERPL-CCNC: 71 U/L (ref 30–99)
ALT SERPL-CCNC: 61 U/L (ref 2–50)
ANION GAP SERPL CALC-SCNC: 15 MMOL/L (ref 7–16)
APPEARANCE UR: ABNORMAL
AST SERPL-CCNC: 102 U/L (ref 12–45)
BACTERIA #/AREA URNS HPF: ABNORMAL /HPF
BASOPHILS # BLD AUTO: 0.5 % (ref 0–1.8)
BASOPHILS # BLD: 0.02 K/UL (ref 0–0.12)
BILIRUB SERPL-MCNC: 0.8 MG/DL (ref 0.1–1.5)
BILIRUB UR QL STRIP.AUTO: NEGATIVE
BUN SERPL-MCNC: 9 MG/DL (ref 8–22)
CALCIUM SERPL-MCNC: 9.3 MG/DL (ref 8.5–10.5)
CHLORIDE SERPL-SCNC: 99 MMOL/L (ref 96–112)
CO2 SERPL-SCNC: 23 MMOL/L (ref 20–33)
COLOR UR: ABNORMAL
CREAT SERPL-MCNC: 0.46 MG/DL (ref 0.5–1.4)
EOSINOPHIL # BLD AUTO: 0.05 K/UL (ref 0–0.51)
EOSINOPHIL NFR BLD: 1.2 % (ref 0–6.9)
EPI CELLS #/AREA URNS HPF: ABNORMAL /HPF
ERYTHROCYTE [DISTWIDTH] IN BLOOD BY AUTOMATED COUNT: 46.9 FL (ref 35.9–50)
GLOBULIN SER CALC-MCNC: 3.3 G/DL (ref 1.9–3.5)
GLUCOSE SERPL-MCNC: 94 MG/DL (ref 65–99)
GLUCOSE UR STRIP.AUTO-MCNC: NEGATIVE MG/DL
HCG SERPL QL: NEGATIVE
HCT VFR BLD AUTO: 41.9 % (ref 37–47)
HGB BLD-MCNC: 13.3 G/DL (ref 12–16)
HYALINE CASTS #/AREA URNS LPF: ABNORMAL /LPF
IMM GRANULOCYTES # BLD AUTO: 0.01 K/UL (ref 0–0.11)
IMM GRANULOCYTES NFR BLD AUTO: 0.2 % (ref 0–0.9)
KETONES UR STRIP.AUTO-MCNC: 15 MG/DL
LEUKOCYTE ESTERASE UR QL STRIP.AUTO: ABNORMAL
LYMPHOCYTES # BLD AUTO: 0.97 K/UL (ref 1–4.8)
LYMPHOCYTES NFR BLD: 22.6 % (ref 22–41)
MCH RBC QN AUTO: 30.2 PG (ref 27–33)
MCHC RBC AUTO-ENTMCNC: 31.7 G/DL (ref 33.6–35)
MCV RBC AUTO: 95.2 FL (ref 81.4–97.8)
MICRO URNS: ABNORMAL
MONOCYTES # BLD AUTO: 0.38 K/UL (ref 0–0.85)
MONOCYTES NFR BLD AUTO: 8.8 % (ref 0–13.4)
MUCOUS THREADS #/AREA URNS HPF: ABNORMAL /HPF
NEUTROPHILS # BLD AUTO: 2.87 K/UL (ref 2–7.15)
NEUTROPHILS NFR BLD: 66.7 % (ref 44–72)
NITRITE UR QL STRIP.AUTO: NEGATIVE
NRBC # BLD AUTO: 0 K/UL
NRBC BLD-RTO: 0 /100 WBC
PH UR STRIP.AUTO: >=9 [PH] (ref 5–8)
PLATELET # BLD AUTO: 138 K/UL (ref 164–446)
PMV BLD AUTO: 10.4 FL (ref 9–12.9)
POTASSIUM SERPL-SCNC: 4 MMOL/L (ref 3.6–5.5)
PROT SERPL-MCNC: 7.7 G/DL (ref 6–8.2)
PROT UR QL STRIP: 30 MG/DL
RBC # BLD AUTO: 4.4 M/UL (ref 4.2–5.4)
RBC # URNS HPF: ABNORMAL /HPF
RBC UR QL AUTO: ABNORMAL
SODIUM SERPL-SCNC: 137 MMOL/L (ref 135–145)
SP GR UR STRIP.AUTO: 1.03
UROBILINOGEN UR STRIP.AUTO-MCNC: 1 MG/DL
WBC # BLD AUTO: 4.3 K/UL (ref 4.8–10.8)
WBC #/AREA URNS HPF: ABNORMAL /HPF

## 2021-05-14 PROCEDURE — 84703 CHORIONIC GONADOTROPIN ASSAY: CPT

## 2021-05-14 PROCEDURE — 81001 URINALYSIS AUTO W/SCOPE: CPT

## 2021-05-14 PROCEDURE — 96374 THER/PROPH/DIAG INJ IV PUSH: CPT

## 2021-05-14 PROCEDURE — 700111 HCHG RX REV CODE 636 W/ 250 OVERRIDE (IP): Performed by: EMERGENCY MEDICINE

## 2021-05-14 PROCEDURE — 74177 CT ABD & PELVIS W/CONTRAST: CPT

## 2021-05-14 PROCEDURE — 99284 EMERGENCY DEPT VISIT MOD MDM: CPT

## 2021-05-14 PROCEDURE — 36415 COLL VENOUS BLD VENIPUNCTURE: CPT

## 2021-05-14 PROCEDURE — 700117 HCHG RX CONTRAST REV CODE 255: Performed by: EMERGENCY MEDICINE

## 2021-05-14 PROCEDURE — 80053 COMPREHEN METABOLIC PANEL: CPT

## 2021-05-14 PROCEDURE — 85025 COMPLETE CBC W/AUTO DIFF WBC: CPT

## 2021-05-14 RX ORDER — LORAZEPAM 2 MG/ML
0.5 INJECTION INTRAMUSCULAR ONCE
Status: COMPLETED | OUTPATIENT
Start: 2021-05-14 | End: 2021-05-14

## 2021-05-14 RX ADMIN — LORAZEPAM 0.5 MG: 2 INJECTION INTRAMUSCULAR; INTRAVENOUS at 13:14

## 2021-05-14 RX ADMIN — IOHEXOL 100 ML: 350 INJECTION, SOLUTION INTRAVENOUS at 14:26

## 2021-05-14 ASSESSMENT — LIFESTYLE VARIABLES: DO YOU DRINK ALCOHOL: YES

## 2021-05-14 ASSESSMENT — FIBROSIS 4 INDEX: FIB4 SCORE: 0.5

## 2021-05-14 NOTE — ED NOTES
Reviewed discharge instructions, pt verbalized understanding of instructions. States she will schedule follow-up appointment. Denies further questions at this time. Pt ambulatory out of ER with steady gait.

## 2021-05-14 NOTE — ED TRIAGE NOTES
Ambulates to triage  Chief Complaint   Patient presents with   • RLQ Pain     comes and goes x5 days   • Nausea/Vomiting/Diarrhea     x5 days     Pt has very vague complaints, doesn't really know when they started, started her period 5 days ago, then said she took a plan B pill 2 days ago. When asked to clarify if she took the plan B pill while on her period, pt could not recall. Pt does not appear to be in any distress in triage.

## 2021-05-14 NOTE — ED PROVIDER NOTES
ED Provider Note    CHIEF COMPLAINT  Chief Complaint   Patient presents with   • RLQ Pain     comes and goes x5 days   • Nausea/Vomiting/Diarrhea     x5 days       HPI  Amalia Villalba is a 25 y.o. female who presents for evaluation of several days of abdominal pain, reported nausea small amount of vomiting and diarrhea with right lower quadrant discomfort noted.  The patient has a history of rheumatism and asthma.  She is currently not on any medications.  She denies any thoracoabdominal surgical history.  Pain is described as dull right lower quadrant.  She still has her appendix.  It does not radiate to the back.  She is currently on her menstrual cycle.  No reported fevers chills hematochezia or melena    REVIEW OF SYSTEMS  See HPI for further details.  No high fevers chills night sweats or weight loss all other systems are negative.     PAST MEDICAL HISTORY  Past Medical History:   Diagnosis Date   • ASTHMA        FAMILY HISTORY  Noncontributory    SOCIAL HISTORY  Social History     Socioeconomic History   • Marital status: Single     Spouse name: Not on file   • Number of children: Not on file   • Years of education: Not on file   • Highest education level: Not on file   Occupational History   • Not on file   Tobacco Use   • Smoking status: Never Smoker   • Smokeless tobacco: Never Used   Vaping Use   • Vaping Use: Every day   Substance and Sexual Activity   • Alcohol use: Yes     Comment: 10 drinks a day   • Drug use: No   • Sexual activity: Not on file   Other Topics Concern   • Not on file   Social History Narrative   • Not on file     Social Determinants of Health     Financial Resource Strain:    • Difficulty of Paying Living Expenses:    Food Insecurity:    • Worried About Running Out of Food in the Last Year:    • Ran Out of Food in the Last Year:    Transportation Needs:    • Lack of Transportation (Medical):    • Lack of Transportation (Non-Medical):    Physical Activity:    • Days of Exercise  "per Week:    • Minutes of Exercise per Session:    Stress:    • Feeling of Stress :    Social Connections:    • Frequency of Communication with Friends and Family:    • Frequency of Social Gatherings with Friends and Family:    • Attends [employer] Services:    • Active Member of Clubs or Organizations:    • Attends Club or Organization Meetings:    • Marital Status:    Intimate Partner Violence:    • Fear of Current or Ex-Partner:    • Emotionally Abused:    • Physically Abused:    • Sexually Abused:        SURGICAL HISTORY  No past surgical history on file.    CURRENT MEDICATIONS  Home Medications     Reviewed by Trudy Davenport R.N. (Registered Nurse) on 05/14/21 at 1151  Med List Status: Complete   Medication Last Dose Status        Patient Janes Taking any Medications                       ALLERGIES  Allergies   Allergen Reactions   • Trinessa [Norgestimate-Eth Estradiol] Rash     rash       PHYSICAL EXAM  VITAL SIGNS: /99   Pulse 68   Temp 36.3 °C (97.4 °F) (Temporal)   Resp 18   Ht 1.753 m (5' 9\")   Wt 56.1 kg (123 lb 10.9 oz)   LMP 05/10/2021   SpO2 97%   BMI 18.26 kg/m²       Constitutional: Well developed, Well nourished, No acute distress, Non-toxic appearance.   HENT: Normocephalic, Atraumatic, Bilateral external ears normal, Oropharynx moist, No oral exudates, Nose normal.   Eyes: PERRLA, EOMI, Conjunctiva normal, No discharge.   Neck: Normal range of motion, No tenderness, Supple, No stridor.   Cardiovascular: Normal heart rate, Normal rhythm, No murmurs, No rubs, No gallops.   Thorax & Lungs: Normal breath sounds, No respiratory distress, No wheezing, No chest tenderness.   Abdomen: Bowel sounds normal, minimal right lower quadrant tenderness on palpation without peritoneal signs no palpable mass no rebound or guarding  Skin: Warm, Dry, No erythema, No rash.   Back: No tenderness, No CVA tenderness.   Extremities: Intact distal pulses, No edema, No tenderness, No cyanosis, No " clubbing.   Neurologic: Alert & oriented x 3, Normal motor function, Normal sensory function, No focal deficits noted.   Psychiatric: Affect normal, Judgment normal, Mood normal.       COURSE & MEDICAL DECISION MAKING  Pertinent Labs & Imaging studies reviewed. (See chart for details)  CT-ABDOMEN-PELVIS WITH   Final Result      1.  There is no acute inflammatory process within the abdomen or pelvis.   2.  There is mild hepatic steatosis.        Results for orders placed or performed during the hospital encounter of 05/14/21   HCG QUAL SERUM   Result Value Ref Range    Beta-Hcg Qualitative Serum Negative Negative   CBC WITH DIFFERENTIAL   Result Value Ref Range    WBC 4.3 (L) 4.8 - 10.8 K/uL    RBC 4.40 4.20 - 5.40 M/uL    Hemoglobin 13.3 12.0 - 16.0 g/dL    Hematocrit 41.9 37.0 - 47.0 %    MCV 95.2 81.4 - 97.8 fL    MCH 30.2 27.0 - 33.0 pg    MCHC 31.7 (L) 33.6 - 35.0 g/dL    RDW 46.9 35.9 - 50.0 fL    Platelet Count 138 (L) 164 - 446 K/uL    MPV 10.4 9.0 - 12.9 fL    Neutrophils-Polys 66.70 44.00 - 72.00 %    Lymphocytes 22.60 22.00 - 41.00 %    Monocytes 8.80 0.00 - 13.40 %    Eosinophils 1.20 0.00 - 6.90 %    Basophils 0.50 0.00 - 1.80 %    Immature Granulocytes 0.20 0.00 - 0.90 %    Nucleated RBC 0.00 /100 WBC    Neutrophils (Absolute) 2.87 2.00 - 7.15 K/uL    Lymphs (Absolute) 0.97 (L) 1.00 - 4.80 K/uL    Monos (Absolute) 0.38 0.00 - 0.85 K/uL    Eos (Absolute) 0.05 0.00 - 0.51 K/uL    Baso (Absolute) 0.02 0.00 - 0.12 K/uL    Immature Granulocytes (abs) 0.01 0.00 - 0.11 K/uL    NRBC (Absolute) 0.00 K/uL   Comp Metabolic Panel   Result Value Ref Range    Sodium 137 135 - 145 mmol/L    Potassium 4.0 3.6 - 5.5 mmol/L    Chloride 99 96 - 112 mmol/L    Co2 23 20 - 33 mmol/L    Anion Gap 15.0 7.0 - 16.0    Glucose 94 65 - 99 mg/dL    Bun 9 8 - 22 mg/dL    Creatinine 0.46 (L) 0.50 - 1.40 mg/dL    Calcium 9.3 8.5 - 10.5 mg/dL    AST(SGOT) 102 (H) 12 - 45 U/L    ALT(SGPT) 61 (H) 2 - 50 U/L    Alkaline Phosphatase 71 30  - 99 U/L    Total Bilirubin 0.8 0.1 - 1.5 mg/dL    Albumin 4.4 3.2 - 4.9 g/dL    Total Protein 7.7 6.0 - 8.2 g/dL    Globulin 3.3 1.9 - 3.5 g/dL    A-G Ratio 1.3 g/dL   URINALYSIS    Specimen: Urine   Result Value Ref Range    Color DK Yellow     Character Cloudy (A)     Specific Gravity 1.029 <1.035    Ph >=9.0 (A) 5.0 - 8.0    Glucose Negative Negative mg/dL    Ketones 15 (A) Negative mg/dL    Protein 30 (A) Negative mg/dL    Bilirubin Negative Negative    Urobilinogen, Urine 1.0 Negative    Nitrite Negative Negative    Leukocyte Esterase Trace (A) Negative    Occult Blood Trace (A) Negative    Micro Urine Req Microscopic    URINE MICROSCOPIC (W/UA)   Result Value Ref Range    WBC 2-5 /hpf    RBC Rare /hpf    Bacteria Rare (A) None /hpf    Epithelial Cells Rare /hpf    Mucous Threads Few /hpf    Hyaline Cast 0-2 /lpf   ESTIMATED GFR   Result Value Ref Range    GFR If African American >60 >60 mL/min/1.73 m 2    GFR If Non African American >60 >60 mL/min/1.73 m 2      An IV is established.  Differential diagnosis was extensive including kidney stones appendicitis gastroenteritis large ovarian cyst.  The patient has a normal work-up here including normal CBC.  Metabolic panel demonstrates minimal transaminitis.  She has no right upper quadrant pain.  Serum pregnancy and urinalysis are negative.  She has no active urinary symptoms.  CT scan with contrast did not demonstrate any acute process.  Also the patient that we have ruled out the majority of true medical and surgical emergencies today.  If her symptoms progress or worsen over the next 48 hours I recommended returning here and otherwise following up with her PCP for ongoing management    FINAL IMPRESSION  1.  Abdominal pain  2.  Nausea, vomiting and diarrhea         Electronically signed by: Con Pacheco M.D., 5/14/2021 12:46 PM

## 2022-06-02 ENCOUNTER — TELEPHONE (OUTPATIENT)
Dept: MEDICAL GROUP | Facility: MEDICAL CENTER | Age: 27
End: 2022-06-02

## 2022-06-02 NOTE — TELEPHONE ENCOUNTER
Phone Number Called: [phone] (home)       Call outcome: Spoke to patient regarding message below.    Message: Pt cancelled appt on mychart does not want to reschedule TS